# Patient Record
Sex: FEMALE | Race: BLACK OR AFRICAN AMERICAN | NOT HISPANIC OR LATINO | Employment: OTHER | URBAN - METROPOLITAN AREA
[De-identification: names, ages, dates, MRNs, and addresses within clinical notes are randomized per-mention and may not be internally consistent; named-entity substitution may affect disease eponyms.]

---

## 2017-01-23 RX ORDER — AMLODIPINE BESYLATE 2.5 MG/1
TABLET ORAL
Qty: 30 TABLET | Refills: 5 | Status: SHIPPED | OUTPATIENT
Start: 2017-01-23 | End: 2017-09-12 | Stop reason: SDUPTHER

## 2017-01-23 RX ORDER — CEPHALEXIN 250 MG/1
CAPSULE ORAL
Qty: 1 EACH | Refills: 5 | Status: SHIPPED | OUTPATIENT
Start: 2017-01-23 | End: 2018-03-21 | Stop reason: SDUPTHER

## 2017-01-23 RX ORDER — MONTELUKAST SODIUM 10 MG/1
TABLET ORAL
Qty: 30 TABLET | Refills: 5 | Status: SHIPPED | OUTPATIENT
Start: 2017-01-23 | End: 2017-09-12 | Stop reason: SDUPTHER

## 2017-01-26 ENCOUNTER — OFFICE VISIT (OUTPATIENT)
Dept: FAMILY MEDICINE | Facility: CLINIC | Age: 77
End: 2017-01-26
Payer: MEDICARE

## 2017-01-26 VITALS
DIASTOLIC BLOOD PRESSURE: 88 MMHG | HEIGHT: 65 IN | SYSTOLIC BLOOD PRESSURE: 146 MMHG | BODY MASS INDEX: 25.45 KG/M2 | HEART RATE: 72 BPM | WEIGHT: 152.75 LBS | TEMPERATURE: 98 F

## 2017-01-26 DIAGNOSIS — R53.83 FATIGUE, UNSPECIFIED TYPE: Primary | ICD-10-CM

## 2017-01-26 DIAGNOSIS — I77.9 DISORDER OF ARTERIES AND ARTERIOLES: ICD-10-CM

## 2017-01-26 DIAGNOSIS — I25.10 NON-OCCLUSIVE CORONARY ARTERY DISEASE: ICD-10-CM

## 2017-01-26 DIAGNOSIS — J42 CHRONIC BRONCHITIS, UNSPECIFIED CHRONIC BRONCHITIS TYPE: ICD-10-CM

## 2017-01-26 DIAGNOSIS — F33.0 MILD EPISODE OF RECURRENT MAJOR DEPRESSIVE DISORDER: ICD-10-CM

## 2017-01-26 PROCEDURE — 1160F RVW MEDS BY RX/DR IN RCRD: CPT | Mod: S$GLB,,, | Performed by: FAMILY MEDICINE

## 2017-01-26 PROCEDURE — 3077F SYST BP >= 140 MM HG: CPT | Mod: S$GLB,,, | Performed by: FAMILY MEDICINE

## 2017-01-26 PROCEDURE — 99999 PR PBB SHADOW E&M-EST. PATIENT-LVL III: CPT | Mod: PBBFAC,,, | Performed by: FAMILY MEDICINE

## 2017-01-26 PROCEDURE — 1125F AMNT PAIN NOTED PAIN PRSNT: CPT | Mod: S$GLB,,, | Performed by: FAMILY MEDICINE

## 2017-01-26 PROCEDURE — 3079F DIAST BP 80-89 MM HG: CPT | Mod: S$GLB,,, | Performed by: FAMILY MEDICINE

## 2017-01-26 PROCEDURE — 99499 UNLISTED E&M SERVICE: CPT | Mod: S$GLB,,, | Performed by: FAMILY MEDICINE

## 2017-01-26 PROCEDURE — 1157F ADVNC CARE PLAN IN RCRD: CPT | Mod: S$GLB,,, | Performed by: FAMILY MEDICINE

## 2017-01-26 PROCEDURE — 90670 PCV13 VACCINE IM: CPT | Mod: S$GLB,,, | Performed by: FAMILY MEDICINE

## 2017-01-26 PROCEDURE — G0009 ADMIN PNEUMOCOCCAL VACCINE: HCPCS | Mod: S$GLB,,, | Performed by: FAMILY MEDICINE

## 2017-01-26 PROCEDURE — 1159F MED LIST DOCD IN RCRD: CPT | Mod: S$GLB,,, | Performed by: FAMILY MEDICINE

## 2017-01-26 PROCEDURE — 99214 OFFICE O/P EST MOD 30 MIN: CPT | Mod: 25,S$GLB,, | Performed by: FAMILY MEDICINE

## 2017-01-26 RX ORDER — DULOXETIN HYDROCHLORIDE 60 MG/1
60 CAPSULE, DELAYED RELEASE ORAL DAILY
Qty: 30 CAPSULE | Refills: 11 | Status: SHIPPED | OUTPATIENT
Start: 2017-01-26 | End: 2018-02-20 | Stop reason: SDUPTHER

## 2017-01-26 RX ORDER — HYDROCODONE BITARTRATE AND ACETAMINOPHEN 10; 325 MG/1; MG/1
1 TABLET ORAL 2 TIMES DAILY PRN
COMMUNITY
End: 2018-01-03

## 2017-01-26 RX ORDER — ZOLPIDEM TARTRATE 5 MG/1
5 TABLET ORAL NIGHTLY PRN
Qty: 30 TABLET | Refills: 0 | Status: SHIPPED | OUTPATIENT
Start: 2017-01-26 | End: 2017-03-17

## 2017-01-27 NOTE — PROGRESS NOTES
The patient presents with a lot of somatic complaints, but mainly fatigue.  She's depressed.  No SI/HI She has stress at home.  Chronic bronchitis followed by pulmonary.  Nonobstructive coronary disease on previous catheterization.  Carotid stenosis followed by cardiology.  Complains of a lot of difficulty sleeping.  She has some confusion about which medications she is taking.  She does see pain management regarding chronic back problems.  Doesn't think trazodone helps her sleep.  Reviewed previous echocardiogram [place] September 2016 mild mitral regurgitation, tricuspid regurgitation, increased right ventricular systolic pressure with mild aortic sclerosis, but no stenosis.  Previous catheterization with mild aortic stenosis    Past Medical History:  Past Medical History   Diagnosis Date    Anxiety state 1/23/2014    CAD (coronary artery disease)     Carotid stenosis 3/25/2013     Overview:  Calcific, nonobstructive (1/31/12, 3/19/13, [facility])     Chronic back pain     Chronic bronchitis     Chronic gastritis     Chronic rhinitis     DDD (degenerative disc disease), cervical     Degeneration of lumbar or lumbosacral intervertebral disc 7/2/2014    Diverticulosis     Fractures 2015     right ring finger    History of stroke     History of stroke      ?    HTN (hypertension)     Hyperlipidemia     Inflammatory polyps of colon 3/17/15    Lumbosacral spondylosis 7/2/2014    Migraine headache     Mild aortic stenosis     Osteoporosis     Syncope     Thyroid nodule      Past Surgical History   Procedure Laterality Date    Carpal tunnel release Right     Hysterectomy      Fine needle aspiration       thyroid - colloid    Esophagogastroduodenoscopy  2/15/2012    Colonoscopy w/ biopsies  8/26/2010     benign mucosa    Lower extremitry angiogram  06/06/2014     no significant disease    Esophagogastroduodenoscopy  3/2015    Fine needle aspiration  1/15/2014     Thyroid- benign    Cardiac  catheterization  12/11/2014     LAD mid-vessel eccentric stenosis of around 50%     Social History     Social History    Marital status:      Spouse name: N/A    Number of children: N/A    Years of education: N/A     Occupational History    Not on file.     Social History Main Topics    Smoking status: Never Smoker    Smokeless tobacco: Not on file    Alcohol use No    Drug use: Not on file    Sexual activity: Not on file     Other Topics Concern    Not on file     Social History Narrative     Family History   Problem Relation Age of Onset    Hypertension Mother     Throat cancer Mother     Heart attack Mother 60     MI    Hypertension Sister     Hypertension Father     Thyroid disease Sister     Stroke Sister      Review of patient's allergies indicates:   Allergen Reactions    Codeine     Sertraline      Unknown^    Sulfa (sulfonamide antibiotics)      Current Outpatient Prescriptions on File Prior to Visit   Medication Sig Dispense Refill    ADVAIR DISKUS 100-50 mcg/dose diskus inhaler INHALE 1 PUFF INTO THE LUNGS TWICE A DAY 1 each 5    albuterol (PROAIR HFA) 90 mcg/actuation inhaler Inhale 2 puffs into the lungs every 6 (six) hours as needed for Wheezing. 3 Inhaler 3    amlodipine (NORVASC) 2.5 MG tablet TAKE 1 TABLET EVERY DAY 30 tablet 5    atorvastatin (LIPITOR) 40 MG tablet Take 40 mg by mouth once daily.       azelastine (ASTELIN) 137 mcg nasal spray 1 spray by Nasal route 2 (two) times daily.      BONIVA 150 mg tablet TAKE 1 TABLET (150 MG TOTAL) BY MOUTH EVERY 30 DAYS. 4 tablet 3    clopidogrel (PLAVIX) 75 mg tablet TAKE 1 TABLET EVERY DAY 90 tablet 0    montelukast (SINGULAIR) 10 mg tablet TAKE 1 TABLET EVERY DAY 30 tablet 5    nitroGLYCERIN (NITROSTAT) 0.4 MG SL tablet Place 0.4 mg under the tongue every 5 (five) minutes as needed for Chest pain.      ranitidine (ZANTAC) 300 MG tablet Take 300 mg by mouth nightly.       [DISCONTINUED] benzonatate (TESSALON) 100 MG  "capsule Take 1 capsule (100 mg total) by mouth 3 (three) times daily as needed. 30 capsule 0    [DISCONTINUED] duloxetine (CYMBALTA) 60 MG capsule Take 1 capsule (60 mg total) by mouth once daily. 30 capsule 11    [DISCONTINUED] isosorbide mononitrate (IMDUR) 30 MG 24 hr tablet Take 1 tablet (30 mg total) by mouth once daily. 90 tablet 3    [DISCONTINUED] LYRICA 50 mg capsule Take 50 mg by mouth 3 (three) times daily.       [DISCONTINUED] ranolazine (RANEXA) 500 MG Tb12 Take 500 mg by mouth 2 (two) times daily.      [DISCONTINUED] tramadol (ULTRAM) 50 mg tablet TAKE 1 TABLET 3 TIMES A DAY AS NEEDED FOR PAIN 90 tablet 0    [DISCONTINUED] trazodone (DESYREL) 50 MG tablet TAKE 1 TABLET IN THE EVENING 90 tablet 1    [DISCONTINUED] azithromycin (Z-MIRACLE) 250 MG tablet Take 2 tablets on day 1, then 1 tablet daily on days 2 - 5. 6 tablet 0    [DISCONTINUED] duloxetine (CYMBALTA) 60 MG capsule TAKE 1 CAPSULE EVERY DAY 30 capsule 5    [DISCONTINUED] methylPREDNISolone (MEDROL DOSEPACK) 4 mg tablet follow package directions 21 tablet 0     No current facility-administered medications on file prior to visit.            ROS:  GENERAL: No fever, chills,  or significant weight changes.   CARDIOVASCULAR: Denies chest pain, PND, orthopnea or reduced exercise tolerance.  ABDOMEN: Appetite fine. Denies diarrhea, abdominal pain, hematemesis or blood in stool.  URINARY: No flank pain, dysuria or hematuria.      OBJECTIVE:     Vitals:    01/26/17 1044   BP: (!) 146/88   Pulse: 72   Temp: 97.5 °F (36.4 °C)   Weight: 69.3 kg (152 lb 12.5 oz)   Height: 5' 5" (1.651 m)     Wt Readings from Last 3 Encounters:   01/26/17 69.3 kg (152 lb 12.5 oz)   06/16/16 72.8 kg (160 lb 7.9 oz)   04/05/16 72.5 kg (159 lb 12.8 oz)     APPEARANCE: Well nourished, well developed, in no acute distress.    HEAD: Normocephalic.  Atraumatic.  No sinus tenderness.  EYES:   Right eye: Pupil reactive.  Conjunctiva clear.    Left eye: Pupil reactive.  " Conjunctiva clear.    Both fundi:  Grossly normal to nondilated exam. EOMI.    EARS: TM's intact. Light reflex normal. No retraction or perforation.    NOSE:  clear.  MOUTH & THROAT:  No pharyngeal erythema or exudate. No lesions.  NECK: Supple. No bruits.  No JVD.  No cervical lymphadenopathy.  No thyromegaly.    CHEST: Breath sounds clear bilaterally.  Normal respiratory effort  CARDIOVASCULAR: Normal rate.  Regular rhythm.  No murmurs.  No rub.  No gallops.  ABDOMEN: Bowel sounds normal.  Soft.  No tenderness.  No organomegaly.  PERIPHERAL VASCULAR: No cyanosis.  No clubbing.  No edema.  NEUROLOGIC: No focal findings.  MENTAL STATUS: Alert.  Oriented x 3.        Marychuy was seen today for headache.    Diagnoses and all orders for this visit:    Fatigue, unspecified type    Mild episode of recurrent major depressive disorder    Chronic bronchitis, unspecified chronic bronchitis type    Non-occlusive coronary artery disease    Carotid stenosis    Other orders  -     zolpidem (AMBIEN) 5 MG Tab; Take 1 tablet (5 mg total) by mouth nightly as needed.  -     duloxetine (CYMBALTA) 60 MG capsule; Take 1 capsule (60 mg total) by mouth once daily.  -     Pneumococcal Conjugate Vaccine (13 Valent) (IM)      Medications Discontinued During This Encounter   Medication Reason    azithromycin (Z-MIRACLE) 250 MG tablet     duloxetine (CYMBALTA) 60 MG capsule     methylPREDNISolone (MEDROL DOSEPACK) 4 mg tablet     tramadol (ULTRAM) 50 mg tablet Patient no longer taking    LYRICA 50 mg capsule Patient no longer taking    benzonatate (TESSALON) 100 MG capsule Patient no longer taking    isosorbide mononitrate (IMDUR) 30 MG 24 hr tablet Patient no longer taking    ranolazine (RANEXA) 500 MG Tb12 Patient no longer taking    trazodone (DESYREL) 50 MG tablet     duloxetine (CYMBALTA) 60 MG capsule Reorder     Follow-up appointment one month

## 2017-03-17 ENCOUNTER — LAB VISIT (OUTPATIENT)
Dept: LAB | Facility: HOSPITAL | Age: 77
End: 2017-03-17
Attending: FAMILY MEDICINE
Payer: MEDICARE

## 2017-03-17 ENCOUNTER — OFFICE VISIT (OUTPATIENT)
Dept: FAMILY MEDICINE | Facility: CLINIC | Age: 77
End: 2017-03-17
Payer: MEDICARE

## 2017-03-17 VITALS
WEIGHT: 151.56 LBS | SYSTOLIC BLOOD PRESSURE: 127 MMHG | DIASTOLIC BLOOD PRESSURE: 75 MMHG | HEART RATE: 85 BPM | BODY MASS INDEX: 25.25 KG/M2 | HEIGHT: 65 IN

## 2017-03-17 DIAGNOSIS — R07.9 CHEST PAIN, UNSPECIFIED TYPE: Primary | ICD-10-CM

## 2017-03-17 DIAGNOSIS — R53.83 FATIGUE, UNSPECIFIED TYPE: ICD-10-CM

## 2017-03-17 DIAGNOSIS — I25.10 NON-OCCLUSIVE CORONARY ARTERY DISEASE: ICD-10-CM

## 2017-03-17 DIAGNOSIS — R30.0 DYSURIA: ICD-10-CM

## 2017-03-17 DIAGNOSIS — F33.0 MILD EPISODE OF RECURRENT MAJOR DEPRESSIVE DISORDER: ICD-10-CM

## 2017-03-17 LAB
ALBUMIN SERPL BCP-MCNC: 3.3 G/DL
ALP SERPL-CCNC: 64 U/L
ALT SERPL W/O P-5'-P-CCNC: 20 U/L
ANION GAP SERPL CALC-SCNC: 7 MMOL/L
AST SERPL-CCNC: 20 U/L
BASOPHILS # BLD AUTO: 0.01 K/UL
BASOPHILS NFR BLD: 0.2 %
BILIRUB SERPL-MCNC: 0.3 MG/DL
BILIRUB UR QL STRIP: NEGATIVE
BUN SERPL-MCNC: 17 MG/DL
CALCIUM SERPL-MCNC: 9.3 MG/DL
CHLORIDE SERPL-SCNC: 107 MMOL/L
CLARITY UR: CLEAR
CO2 SERPL-SCNC: 30 MMOL/L
COLOR UR: YELLOW
CREAT SERPL-MCNC: 1 MG/DL
DIFFERENTIAL METHOD: NORMAL
EOSINOPHIL # BLD AUTO: 0 K/UL
EOSINOPHIL NFR BLD: 0.8 %
ERYTHROCYTE [DISTWIDTH] IN BLOOD BY AUTOMATED COUNT: 13.6 %
ERYTHROCYTE [SEDIMENTATION RATE] IN BLOOD BY WESTERGREN METHOD: 17 MM/HR
EST. GFR  (AFRICAN AMERICAN): >60 ML/MIN/1.73 M^2
EST. GFR  (NON AFRICAN AMERICAN): 54.1 ML/MIN/1.73 M^2
GLUCOSE SERPL-MCNC: 108 MG/DL
GLUCOSE UR QL STRIP: NEGATIVE
HCT VFR BLD AUTO: 41.5 %
HGB BLD-MCNC: 13.4 G/DL
HGB UR QL STRIP: NEGATIVE
KETONES UR QL STRIP: NEGATIVE
LEUKOCYTE ESTERASE UR QL STRIP: NEGATIVE
LYMPHOCYTES # BLD AUTO: 1.4 K/UL
LYMPHOCYTES NFR BLD: 29.1 %
MCH RBC QN AUTO: 28.5 PG
MCHC RBC AUTO-ENTMCNC: 32.3 %
MCV RBC AUTO: 88 FL
MONOCYTES # BLD AUTO: 0.4 K/UL
MONOCYTES NFR BLD: 9.1 %
NEUTROPHILS # BLD AUTO: 2.9 K/UL
NEUTROPHILS NFR BLD: 60.4 %
NITRITE UR QL STRIP: NEGATIVE
PH UR STRIP: 6 [PH] (ref 5–8)
PLATELET # BLD AUTO: 197 K/UL
PMV BLD AUTO: 11.5 FL
POTASSIUM SERPL-SCNC: 4.1 MMOL/L
PROT SERPL-MCNC: 7.2 G/DL
PROT UR QL STRIP: ABNORMAL
RBC # BLD AUTO: 4.71 M/UL
SODIUM SERPL-SCNC: 144 MMOL/L
SP GR UR STRIP: 1.03 (ref 1–1.03)
TSH SERPL DL<=0.005 MIU/L-ACNC: 0.73 UIU/ML
URN SPEC COLLECT METH UR: ABNORMAL
WBC # BLD AUTO: 4.74 K/UL

## 2017-03-17 PROCEDURE — 84443 ASSAY THYROID STIM HORMONE: CPT

## 2017-03-17 PROCEDURE — 99499 UNLISTED E&M SERVICE: CPT | Mod: S$GLB,,, | Performed by: FAMILY MEDICINE

## 2017-03-17 PROCEDURE — 36415 COLL VENOUS BLD VENIPUNCTURE: CPT | Mod: PO

## 2017-03-17 PROCEDURE — 85025 COMPLETE CBC W/AUTO DIFF WBC: CPT

## 2017-03-17 PROCEDURE — 99999 PR PBB SHADOW E&M-EST. PATIENT-LVL III: CPT | Mod: PBBFAC,,, | Performed by: FAMILY MEDICINE

## 2017-03-17 PROCEDURE — 1157F ADVNC CARE PLAN IN RCRD: CPT | Mod: S$GLB,,, | Performed by: FAMILY MEDICINE

## 2017-03-17 PROCEDURE — 1125F AMNT PAIN NOTED PAIN PRSNT: CPT | Mod: S$GLB,,, | Performed by: FAMILY MEDICINE

## 2017-03-17 PROCEDURE — 85651 RBC SED RATE NONAUTOMATED: CPT | Mod: PO

## 2017-03-17 PROCEDURE — 1159F MED LIST DOCD IN RCRD: CPT | Mod: S$GLB,,, | Performed by: FAMILY MEDICINE

## 2017-03-17 PROCEDURE — 3074F SYST BP LT 130 MM HG: CPT | Mod: S$GLB,,, | Performed by: FAMILY MEDICINE

## 2017-03-17 PROCEDURE — 3078F DIAST BP <80 MM HG: CPT | Mod: S$GLB,,, | Performed by: FAMILY MEDICINE

## 2017-03-17 PROCEDURE — 80053 COMPREHEN METABOLIC PANEL: CPT

## 2017-03-17 PROCEDURE — 99214 OFFICE O/P EST MOD 30 MIN: CPT | Mod: S$GLB,,, | Performed by: FAMILY MEDICINE

## 2017-03-17 RX ORDER — NAPROXEN SODIUM 220 MG/1
81 TABLET, FILM COATED ORAL DAILY
COMMUNITY
End: 2017-05-22 | Stop reason: SDUPTHER

## 2017-03-17 RX ORDER — PANTOPRAZOLE SODIUM 40 MG/1
40 TABLET, DELAYED RELEASE ORAL DAILY
COMMUNITY
Start: 2017-03-04 | End: 2017-03-17

## 2017-03-17 RX ORDER — PANTOPRAZOLE SODIUM 40 MG/1
40 TABLET, DELAYED RELEASE ORAL 2 TIMES DAILY
COMMUNITY
End: 2018-01-03 | Stop reason: SDUPTHER

## 2017-03-17 RX ORDER — ATORVASTATIN CALCIUM 80 MG/1
40 TABLET, FILM COATED ORAL DAILY
COMMUNITY
End: 2021-01-20 | Stop reason: ALTCHOICE

## 2017-03-17 NOTE — MR AVS SNAPSHOT
[facility]  [address] [address] [facility] [address]  Phone: [phone]  Fax: [phone]                  Marychuy Bruce   3/17/2017 10:00 AM   Office Visit    Description:  Female : 1940   Provider:  Yohannes Toribio MD   Department:  [facility]           Reason for Visit     Follow-up           Diagnoses this Visit        Comments    Chest pain, unspecified type    -  Primary     Mild episode of recurrent major depressive disorder         Dysuria         Non-occlusive coronary artery disease         Fatigue, unspecified type                To Do List           Future Appointments        Provider Department Dept Phone    3/17/2017 2:25 PM LABORATORY, [place] [facility] [phone]      Goals (5 Years of Data)     None      [facility] On [facility]     [facility] -  Assistance  Registered nurses in the [facility] provide clinical advisement, health education, appointment booking, and other advisory services.  Call for this free service at [phone].             Medications           Message regarding Medications     Verify the changes and/or additions to your medication regime listed below are the same as discussed with your clinician today.  If any of these changes or additions are incorrect, please notify your healthcare provider.        STOP taking these medications     ranitidine (ZANTAC) 300 MG tablet Take 300 mg by mouth nightly.     zolpidem (AMBIEN) 5 MG Tab Take 1 tablet (5 mg total) by mouth nightly as needed.           Verify that the below list of medications is an accurate representation of the medications you are currently taking.  If none reported, the list may be blank. If incorrect, please contact your healthcare provider. Carry this list with you in case of emergency.           Current Medications     ADVAIR DISKUS 100-50 mcg/dose diskus inhaler INHALE 1 PUFF INTO THE LUNGS TWICE A DAY     "albuterol (PROAIR HFA) 90 mcg/actuation inhaler Inhale 2 puffs into the lungs every 6 (six) hours as needed for Wheezing.    amlodipine (NORVASC) 2.5 MG tablet TAKE 1 TABLET EVERY DAY    aspirin 81 MG Chew Take 81 mg by mouth once daily.    atorvastatin (LIPITOR) 80 MG tablet Take 80 mg by mouth once daily.    BONIVA 150 mg tablet TAKE 1 TABLET (150 MG TOTAL) BY MOUTH EVERY 30 DAYS.    duloxetine (CYMBALTA) 60 MG capsule Take 1 capsule (60 mg total) by mouth once daily.    hydrocodone-acetaminophen 10-325mg (NORCO)  mg Tab Take 1 tablet by mouth 2 (two) times daily as needed.    montelukast (SINGULAIR) 10 mg tablet TAKE 1 TABLET EVERY DAY    nitroGLYCERIN (NITROSTAT) 0.4 MG SL tablet Place 0.4 mg under the tongue every 5 (five) minutes as needed for Chest pain.    pantoprazole (PROTONIX) 40 MG tablet Take 40 mg by mouth once daily.    azelastine (ASTELIN) 137 mcg nasal spray 1 spray by Nasal route 2 (two) times daily.    clopidogrel (PLAVIX) 75 mg tablet TAKE 1 TABLET EVERY DAY           Clinical Reference Information           Your Vitals Were     BP Pulse Height Weight BMI    127/75 85 5' 5" (1.651 m) 68.8 kg (151 lb 9.1 oz) 25.22 kg/m2      Blood Pressure          Most Recent Value    BP  127/75      Allergies as of 3/17/2017     Codeine    Sertraline    Sulfa (Sulfonamide Antibiotics)      Immunizations Administered on Date of Encounter - 3/17/2017     None      Orders Placed During Today's Visit      Normal Orders This Visit    Ambulatory referral to Cardiology     Ambulatory referral to Psychology     Urinalysis     Future Labs/Procedures Expected by Expires    CBC auto differential  3/17/2017 3/17/2018    Comprehensive metabolic panel  3/17/2017 3/17/2018    Sedimentation rate, manual  3/17/2017 3/17/2018    TSH  3/17/2017 3/18/2018      Language Assistance Services     ATTENTION: Language assistance services are available, free of charge. Please call [phone].      ATENCIÓN: Alec hahn, " tiene a gallagher disposición servicios gratuitos de asistencia lingüística. Llsabina al [phone].     ANN Ý: N?u b?n nói Ti?ng Vi?t, có các d?ch v? h? tr? ngôn ng? mi?n phí dành cho b?n. G?i s? [phone].         [facility] complies with applicable Federal civil rights laws and does not discriminate on the basis of race, color, national origin, age, disability, or sex.

## 2017-03-17 NOTE — PROGRESS NOTES
The patient presents follow-up lot of somatic complaints, but mainly fatigue.  She's still has some depression  No SI/HI She has stress at home.   had a stroke and his memory issues.  She did get some minimal dysuria couple days ago which has resolved.  She did have some chest discomfort to her left arm lasting about 15 minutes a couple of nights ago which improved with nitroglycerin.  She's due for follow-up with her cardiologist.  Chronic bronchitis followed by pulmonary.  Nonobstructive coronary disease on previous catheterization.  Carotid stenosis followed by cardiology.  Feels she is sleeping better. She does see pain management regarding chronic back problems.    Reviewed previous echocardiogram [place] September 2016 mild mitral regurgitation, tricuspid regurgitation, increased right ventricular systolic pressure with mild aortic sclerosis, but no stenosis.  Previous catheterization with mild aortic stenosis.  Some cough in the past week    Past Medical History:  Past Medical History:   Diagnosis Date    Anxiety state 1/23/2014    CAD (coronary artery disease)     Carotid stenosis 3/25/2013    Overview:  Calcific, nonobstructive (1/31/12, 3/19/13, [facility])     Chronic back pain     Chronic bronchitis     Chronic gastritis     Chronic rhinitis     DDD (degenerative disc disease), cervical     Degeneration of lumbar or lumbosacral intervertebral disc 7/2/2014    Diverticulosis     Fractures 2015    right ring finger    History of stroke     History of stroke     ?    HTN (hypertension)     Hyperlipidemia     Inflammatory polyps of colon 3/17/15    Lumbosacral spondylosis 7/2/2014    Migraine headache     Mild aortic stenosis     Osteoporosis     Syncope     Thyroid nodule      Past Surgical History:   Procedure Laterality Date    CARDIAC CATHETERIZATION  12/11/2014    LAD mid-vessel eccentric stenosis of around 50%    CARPAL TUNNEL RELEASE Right     COLONOSCOPY W/ BIOPSIES   8/26/2010    benign mucosa    ESOPHAGOGASTRODUODENOSCOPY  2/15/2012    ESOPHAGOGASTRODUODENOSCOPY  3/2015    FINE NEEDLE ASPIRATION      thyroid - colloid    FINE NEEDLE ASPIRATION  1/15/2014    Thyroid- benign    HYSTERECTOMY      Lower extremitry angiogram  06/06/2014    no significant disease     Social History     Social History    Marital status:      Spouse name: N/A    Number of children: N/A    Years of education: N/A     Occupational History    Not on file.     Social History Main Topics    Smoking status: Never Smoker    Smokeless tobacco: Not on file    Alcohol use No    Drug use: Not on file    Sexual activity: Not on file     Other Topics Concern    Not on file     Social History Narrative     Family History   Problem Relation Age of Onset    Hypertension Mother     Throat cancer Mother     Heart attack Mother 60     MI    Hypertension Sister     Hypertension Father     Thyroid disease Sister     Stroke Sister      Review of patient's allergies indicates:   Allergen Reactions    Codeine     Sertraline      Unknown^    Sulfa (sulfonamide antibiotics)      Current Outpatient Prescriptions on File Prior to Visit   Medication Sig Dispense Refill    ADVAIR DISKUS 100-50 mcg/dose diskus inhaler INHALE 1 PUFF INTO THE LUNGS TWICE A DAY 1 each 5    albuterol (PROAIR HFA) 90 mcg/actuation inhaler Inhale 2 puffs into the lungs every 6 (six) hours as needed for Wheezing. 3 Inhaler 3    amlodipine (NORVASC) 2.5 MG tablet TAKE 1 TABLET EVERY DAY 30 tablet 5    BONIVA 150 mg tablet TAKE 1 TABLET (150 MG TOTAL) BY MOUTH EVERY 30 DAYS. 4 tablet 3    duloxetine (CYMBALTA) 60 MG capsule Take 1 capsule (60 mg total) by mouth once daily. 30 capsule 11    hydrocodone-acetaminophen 10-325mg (NORCO)  mg Tab Take 1 tablet by mouth 2 (two) times daily as needed.      montelukast (SINGULAIR) 10 mg tablet TAKE 1 TABLET EVERY DAY 30 tablet 5    nitroGLYCERIN (NITROSTAT) 0.4 MG SL  "tablet Place 0.4 mg under the tongue every 5 (five) minutes as needed for Chest pain.      azelastine (ASTELIN) 137 mcg nasal spray 1 spray by Nasal route 2 (two) times daily.      clopidogrel (PLAVIX) 75 mg tablet TAKE 1 TABLET EVERY DAY 90 tablet 0    [DISCONTINUED] atorvastatin (LIPITOR) 40 MG tablet Take 40 mg by mouth once daily.       [DISCONTINUED] ranitidine (ZANTAC) 300 MG tablet Take 300 mg by mouth nightly.       [DISCONTINUED] zolpidem (AMBIEN) 5 MG Tab Take 1 tablet (5 mg total) by mouth nightly as needed. 30 tablet 0     No current facility-administered medications on file prior to visit.            ROS:  GENERAL: No fever, chills,  or significant weight changes.   CARDIOVASCULAR: Denies chest pain, PND, orthopnea or reduced exercise tolerance.  ABDOMEN: Appetite fine. Denies diarrhea, abdominal pain, hematemesis or blood in stool.  URINARY: No flank pain, dysuria or hematuria.      OBJECTIVE:     Vitals:    03/17/17 0954   BP: 127/75   Pulse: 85   Weight: 68.8 kg (151 lb 9.1 oz)   Height: 5' 5" (1.651 m)     Wt Readings from Last 3 Encounters:   03/17/17 68.8 kg (151 lb 9.1 oz)   01/26/17 69.3 kg (152 lb 12.5 oz)   06/16/16 72.8 kg (160 lb 7.9 oz)     APPEARANCE: Well nourished, well developed, in no acute distress.    HEAD: Normocephalic.  Atraumatic.  No sinus tenderness.  EYES:   Right eye: Pupil reactive.  Conjunctiva clear.    Left eye: Pupil reactive.  Conjunctiva clear.    Both fundi:  Grossly normal to nondilated exam. EOMI.    EARS: TM's intact. Light reflex normal. No retraction or perforation.    NOSE:  clear.  MOUTH & THROAT:  No pharyngeal erythema or exudate. No lesions.  NECK: Supple. No bruits.  No JVD.  No cervical lymphadenopathy.  No thyromegaly.    CHEST: Breath sounds clear bilaterally.  Normal respiratory effort  CARDIOVASCULAR: Normal rate.  Regular rhythm.  No murmurs.  No rub.  No gallops.  ABDOMEN: Bowel sounds normal.  Soft.  No tenderness.  No organomegaly.  PERIPHERAL " VASCULAR: No cyanosis.  No clubbing.  No edema.  NEUROLOGIC: No focal findings.  MENTAL STATUS: Alert.  Oriented x 3.        Marychuy was seen today for follow-up.    Diagnoses and all orders for this visit:    Chest pain, unspecified type  -     Ambulatory referral to Cardiology    Mild episode of recurrent major depressive disorder  -     Ambulatory referral to Psychology    Dysuria  -     Urinalysis    Non-occlusive coronary artery disease  -     Ambulatory referral to Cardiology    Fatigue, unspecified type  -     CBC auto differential; Future  -     Comprehensive metabolic panel; Future  -     Sedimentation rate, manual; Future  -     TSH; Future      Medications Discontinued During This Encounter   Medication Reason    pantoprazole (PROTONIX) 40 MG tablet     atorvastatin (LIPITOR) 40 MG tablet Discontinued by another clinician    ranitidine (ZANTAC) 300 MG tablet Discontinued by another clinician    zolpidem (AMBIEN) 5 MG Tab Patient no longer taking     Medication list updated.  She'll continue medications as per current list today.  She didn't bring her medicines in today.  She'll schedule follow-up with her cardiologist.  ER precautions given.  Follow-up pending above laboratory

## 2017-03-20 ENCOUNTER — TELEPHONE (OUTPATIENT)
Dept: FAMILY MEDICINE | Facility: CLINIC | Age: 77
End: 2017-03-20

## 2017-03-20 NOTE — TELEPHONE ENCOUNTER
Patient does not have voice mail, letter mailed asking her to contact her insurance company for a doctor she can see.

## 2017-03-20 NOTE — TELEPHONE ENCOUNTER
----- Message from Bianca Berrios sent at 3/20/2017 10:58 AM CDT -----  Call pt at  [phone]//returning your call//chano jamison

## 2017-03-20 NOTE — TELEPHONE ENCOUNTER
----- Message from Mary Sadler sent at 3/20/2017 10:32 AM CDT -----  Contact: dr zavala  States he received referral for pt but is not in network for pt insurance. Please call back at [phone]. Thanks//cdb

## 2017-03-21 RX ORDER — CLOPIDOGREL BISULFATE 75 MG/1
TABLET ORAL
Qty: 90 TABLET | Refills: 3 | Status: SHIPPED | OUTPATIENT
Start: 2017-03-21 | End: 2017-05-22 | Stop reason: SDUPTHER

## 2017-03-21 RX ORDER — CLOPIDOGREL BISULFATE 75 MG/1
TABLET ORAL
Qty: 90 TABLET | Refills: 0 | Status: SHIPPED | OUTPATIENT
Start: 2017-03-21 | End: 2018-04-23 | Stop reason: SDUPTHER

## 2017-05-16 ENCOUNTER — PATIENT OUTREACH (OUTPATIENT)
Dept: ADMINISTRATIVE | Facility: CLINIC | Age: 77
End: 2017-05-16
Payer: MEDICARE

## 2017-05-16 ENCOUNTER — TELEPHONE (OUTPATIENT)
Dept: FAMILY MEDICINE | Facility: CLINIC | Age: 77
End: 2017-05-16

## 2017-05-16 NOTE — TELEPHONE ENCOUNTER
----- Message from Christiana Nelson sent at 5/16/2017 11:14 AM CDT -----  Contact: Self  Pt is returning a call to Ms. Denisse.    She can be reached at [phone].    Thank you.

## 2017-05-16 NOTE — PROGRESS NOTES
Discharge Information     Discharge Date:  5/14/17          Primary Discharge Diagnosis:  Acute chest pain          Denisse Pittman RN attempted to contact patient. No answer. The following message was left for the patient to return the call:  Good morning, I am a nurse calling on behalf of [facility] from the [facility].  This is a Transitional Care Call for Marychuy Bruce. When you have a moment please contact us at [phone] or [phone] Monday through Friday, between the hours of 8 am to 4 pm. We look forward to speaking with you. On behalf of [facility] have a nice day.    The patient has a scheduled HOSFU appointment with Yohannes Toribio MD on 5/22/17 @ 0900hrs. Message sent to Physician staff.

## 2017-05-22 ENCOUNTER — OFFICE VISIT (OUTPATIENT)
Dept: FAMILY MEDICINE | Facility: CLINIC | Age: 77
End: 2017-05-22
Payer: MEDICARE

## 2017-05-22 VITALS
TEMPERATURE: 98 F | HEART RATE: 69 BPM | BODY MASS INDEX: 24.32 KG/M2 | DIASTOLIC BLOOD PRESSURE: 76 MMHG | WEIGHT: 145.94 LBS | HEIGHT: 65 IN | SYSTOLIC BLOOD PRESSURE: 130 MMHG

## 2017-05-22 DIAGNOSIS — R53.83 FATIGUE, UNSPECIFIED TYPE: ICD-10-CM

## 2017-05-22 DIAGNOSIS — R07.89 OTHER CHEST PAIN: Primary | ICD-10-CM

## 2017-05-22 DIAGNOSIS — Z79.899 POLYPHARMACY: ICD-10-CM

## 2017-05-22 PROCEDURE — 99999 PR PBB SHADOW E&M-EST. PATIENT-LVL IV: CPT | Mod: PBBFAC,,, | Performed by: FAMILY MEDICINE

## 2017-05-22 PROCEDURE — 99499 UNLISTED E&M SERVICE: CPT | Mod: S$GLB,,, | Performed by: FAMILY MEDICINE

## 2017-05-22 RX ORDER — PREGABALIN 50 MG/1
50 CAPSULE ORAL DAILY
COMMUNITY
Start: 2015-02-03 | End: 2018-01-03

## 2017-05-22 RX ORDER — FLUTICASONE PROPIONATE 50 MCG
2 SPRAY, SUSPENSION (ML) NASAL DAILY
COMMUNITY
Start: 2016-12-22 | End: 2018-01-03

## 2017-05-22 RX ORDER — ASPIRIN 81 MG/1
81 TABLET ORAL
COMMUNITY

## 2017-05-22 NOTE — PROGRESS NOTES
Patient presents follow-up as per below discharge summary after hospitalization for chest pain.  Negative nuclear stress test.  Denies persistent symptoms.  She does feel generally weak which is chronic and has a lot of somatic symptoms as previously.    Theresa Pal NP - 05/14/2017 2:42 PM CDT  DATES OF SERVICE: 05/13/2017 -     ADMITTING DIAGNOSES:  1. Chest pain.  2. Hypertension.  3. Hyperlipidemia.  4. Left thyroid nodule.  5. Gastroesophageal reflux disease.    DISCHARGE DIAGNOSES:  1. Chest pain.  2. Hypertension.  3. Hyperlipidemia.  4. Left thyroid nodule.  5. Gastroesophageal reflux disease.    CONSULTS: None.    PROCEDURES: None.    HOSPITAL COURSE: The patient is a 77-year-old black female who has an   extensive past medical history, who presented to the Emergency Department via   EMS with complaints of chest pain that woke her up from sleep. She described   the pain as sharp and severe, located in the middle of her chest, radiating to  her bilateral shoulders and arms, accompanied by nausea, shortness of breath,  near syncope, and diaphoresis. She took 2 sublingual nitroglycerin tablets   with minimal improvement in her symptoms. She denies experiencing any pain   similar to this in the past. When EMS arrived, she was given 4 baby aspirin,   which improved her pain. Past medical history relative to her presenting   complaint is nonobstructive coronary artery disease by left heart cath in   2013. She is followed by Dr. Hinds in the outpatient setting. She had a   recent ultrasound done a couple of days ago, which revealed a 2.4 x 1.8 x 2.2   cm partially cystic, partially solid nodule within the left thyroid lobe. In   the Emergency Department, her initial workup was unremarkable, including her   initial cardiac markers, which were negative, and an EKG, which showed no   acute ST or T wave abnormalities. She was admitted to the observation unit,   RDTU protocol. Acute coronary syndrome was ruled out with  negative cardiac   markers x3 sets. She underwent a nuclear medicine perfusion SPECT stress and   rest tests with the impression of a normal nuclear stress test. She has had   no further complaints of chest pain. Other diagnostic images done this   hospitalization include a 2-view chest x-ray, which showed no acute findings.   Her labs are stable on date of discharge. She will be discharged home today.  She is already on a PPI; therefore, we will have her follow up with her PCP,  Dr. Troibio, to explore noncardiac sources of chest pain. She will continue   with her cardiac medications as before.    For her hypertension, her blood pressures are stable and controlled. She will  continue with her current regimen. For her hyperlipidemia, she will continue  with statin and a cardiac diet. For her left thyroid nodule, she will follow  up with Dr. Toribio for further outpatient management of this. For her GERD,   she will continue with PPI.    She is hemodynamically stable and will be discharged home today. I have   explained the discharge instructions to her. All questions have been   answered.    DISPOSITION: Home.    FOLLOWUP: Follow up with Dr. Toribio in 1 week, Dr. Hinds in 2 weeks.    MEDICATIONS: Include the followin. Tudorza 1 puff b.i.d.  2. Advair 100/50 mcg 1 puff b.i.d.  3. Norvasc 5 mg daily.  4. Enteric-coated 81 mg aspirin daily.  5. Atorvastatin 80 mg daily.  6. Cymbalta 30 mg daily.  7. Flonase 2 sprays each naris nightly.  8. Norco 7.5 mg 1 p.o. every 6 hours as needed for pain  9. Boniva 150 mg q.30 days.  10. Lyrica 50 mg daily.  11. Mobic 7.5 mg daily.  12. Singulair 10 mg nightly.  13. Nitrostat 0.4 mg sublingual q.5 minutes x3 p.r.n. chest pain.  14. Protonix 40 mg daily.  15. Plavix 75 mg daily.  16. ProAir 2 puffs q.4-6 hours p.r.n. wheezing and shortness of breath.  17. Ranexa 500 mg b.i.d.  18. Carafate 2 grams 4 times a day.  19. Tramadol 50 mg t.i.d.  20. Trazodone 50 mg daily.    ACTIVITY:  As tolerated.    DIET: Cardiac, low cholesterol, no added salt.    Theresa Pal NP  For a full list of reconciled medications on discharge, please refer to the   Discharge Orders and After Visit Summary.   V# [id number] D# [id number]  D: aw/WT: DS/T: sun  DD: 05/14/2017 14:02 TD: 05/14/2017 14:42           Past Medical History:  Past Medical History:   Diagnosis Date    Anxiety state 1/23/2014    CAD (coronary artery disease)     Carotid stenosis 3/25/2013    Overview:  Calcific, nonobstructive (1/31/12, 3/19/13, [facility])     Chronic back pain     Chronic bronchitis     Chronic gastritis     Chronic rhinitis     DDD (degenerative disc disease), cervical     Degeneration of lumbar or lumbosacral intervertebral disc 7/2/2014    Diverticulosis     Fractures 2015    right ring finger    History of stroke     History of stroke     ?    HTN (hypertension)     Hyperlipidemia     Inflammatory polyps of colon 3/17/15    Lumbosacral spondylosis 7/2/2014    Migraine headache     Mild aortic stenosis     Osteoporosis     Syncope     Thyroid nodule      Past Surgical History:   Procedure Laterality Date    CARDIAC CATHETERIZATION  12/11/2014    LAD mid-vessel eccentric stenosis of around 50%    CARPAL TUNNEL RELEASE Right     COLONOSCOPY W/ BIOPSIES  8/26/2010    benign mucosa    ESOPHAGOGASTRODUODENOSCOPY  2/15/2012    ESOPHAGOGASTRODUODENOSCOPY  3/2015    FINE NEEDLE ASPIRATION      thyroid - colloid    FINE NEEDLE ASPIRATION  1/15/2014    Thyroid- benign    HYSTERECTOMY      Lower extremitry angiogram  06/06/2014    no significant disease     Social History     Social History    Marital status:      Spouse name: N/A    Number of children: N/A    Years of education: N/A     Occupational History    Not on file.     Social History Main Topics    Smoking status: Never Smoker    Smokeless tobacco: Not on file    Alcohol use No    Drug use: Unknown    Sexual activity: Not on file     Other  Topics Concern    Not on file     Social History Narrative    No narrative on file     Family History   Problem Relation Age of Onset    Hypertension Mother     Throat cancer Mother     Heart attack Mother 60     MI    Hypertension Sister     Hypertension Father     Thyroid disease Sister     Stroke Sister      Review of patient's allergies indicates:   Allergen Reactions    Codeine     Sertraline      Unknown^    Sulfa (sulfonamide antibiotics)      Current Outpatient Prescriptions on File Prior to Visit   Medication Sig Dispense Refill    ADVAIR DISKUS 100-50 mcg/dose diskus inhaler INHALE 1 PUFF INTO THE LUNGS TWICE A DAY 1 each 5    albuterol (PROAIR HFA) 90 mcg/actuation inhaler Inhale 2 puffs into the lungs every 6 (six) hours as needed for Wheezing. 3 Inhaler 3    amlodipine (NORVASC) 2.5 MG tablet TAKE 1 TABLET EVERY DAY 30 tablet 5    atorvastatin (LIPITOR) 80 MG tablet Take 80 mg by mouth once daily.      BONIVA 150 mg tablet TAKE 1 TABLET (150 MG TOTAL) BY MOUTH EVERY 30 DAYS. 4 tablet 3    clopidogrel (PLAVIX) 75 mg tablet TAKE 1 TABLET EVERY DAY 90 tablet 0    duloxetine (CYMBALTA) 60 MG capsule Take 1 capsule (60 mg total) by mouth once daily. 30 capsule 11    hydrocodone-acetaminophen 10-325mg (NORCO)  mg Tab Take 1 tablet by mouth 2 (two) times daily as needed.      montelukast (SINGULAIR) 10 mg tablet TAKE 1 TABLET EVERY DAY 30 tablet 5    nitroGLYCERIN (NITROSTAT) 0.4 MG SL tablet Place 0.4 mg under the tongue every 5 (five) minutes as needed for Chest pain.      pantoprazole (PROTONIX) 40 MG tablet Take 40 mg by mouth once daily.      [DISCONTINUED] azelastine (ASTELIN) 137 mcg nasal spray 1 spray by Nasal route 2 (two) times daily.      [DISCONTINUED] aspirin 81 MG Chew Take 81 mg by mouth once daily.      [DISCONTINUED] clopidogrel (PLAVIX) 75 mg tablet TAKE 1 TABLET EVERY DAY 90 tablet 3     No current facility-administered medications on file prior to visit.   "          ROS:  GENERAL: No fever, chills,  or significant weight changes.   CARDIOVASCULAR: Denies chest pain, PND, orthopnea or reduced exercise tolerance.  ABDOMEN: Appetite fine. Denies diarrhea, abdominal pain, hematemesis or blood in stool.  URINARY: No flank pain, dysuria or hematuria.      OBJECTIVE:     Vitals:    05/22/17 0845   BP: 130/76   Pulse: 69   Temp: 98 °F (36.7 °C)   Weight: 66.2 kg (145 lb 15.1 oz)   Height: 5' 5" (1.651 m)     Wt Readings from Last 3 Encounters:   05/22/17 66.2 kg (145 lb 15.1 oz)   03/17/17 68.8 kg (151 lb 9.1 oz)   01/26/17 69.3 kg (152 lb 12.5 oz)     APPEARANCE: Well nourished, well developed, in no acute distress.    HEAD: Normocephalic.  Atraumatic.  No sinus tenderness.  EYES:   Right eye: Pupil reactive.  Conjunctiva clear.    Left eye: Pupil reactive.  Conjunctiva clear.    Both fundi:  Grossly normal to nondilated exam. EOMI.    EARS: TM's intact. Light reflex normal. No retraction or perforation.    NOSE:  clear.  MOUTH & THROAT:  No pharyngeal erythema or exudate. No lesions.  NECK: Supple. No bruits.  No JVD.  No cervical lymphadenopathy.  No thyromegaly.    CHEST: Breath sounds clear bilaterally.  Normal respiratory effort  CARDIOVASCULAR: Normal rate.  Regular rhythm.  No murmurs.  No rub.  No gallops.  ABDOMEN: Bowel sounds normal.  Soft.  No tenderness.  No organomegaly.  PERIPHERAL VASCULAR: No cyanosis.  No clubbing.  No edema.  NEUROLOGIC: No focal findings.  MENTAL STATUS: Alert.  Oriented x 3.    Marychuy was seen today for transitional care, nausea and dizziness.    Diagnoses and all orders for this visit:    Other chest pain  -     US Abdomen Complete; Future    Polypharmacy    Fatigue, unspecified type      Follow-up 1 month.  Bring in all her medications so that we can confirm what she is actually taking.    Transitional Care Note    Family and/or Caretaker present at visit?  No.  Diagnostic tests reviewed/disposition: No diagnosic tests pending after " this hospitalization.  Disease/illness education: yes  Home health/community services discussion/referrals: Patient does not have home health established from hospital visit.  They do not need home health.  If needed, we will set up home health for the patient.   Establishment or re-establishment of referral orders for community resources: No other necessary community resources.   Discussion with other health care providers: No discussion with other health care providers necessary.

## 2017-05-29 ENCOUNTER — TELEPHONE (OUTPATIENT)
Dept: RADIOLOGY | Facility: HOSPITAL | Age: 77
End: 2017-05-29

## 2017-05-30 ENCOUNTER — HOSPITAL ENCOUNTER (OUTPATIENT)
Dept: RADIOLOGY | Facility: HOSPITAL | Age: 77
Discharge: HOME | End: 2017-05-30
Attending: FAMILY MEDICINE
Payer: MEDICARE

## 2017-05-30 DIAGNOSIS — R07.89 OTHER CHEST PAIN: ICD-10-CM

## 2017-05-30 PROCEDURE — 76700 US EXAM ABDOM COMPLETE: CPT | Mod: 26,,, | Performed by: RADIOLOGY

## 2017-05-30 PROCEDURE — 76700 US EXAM ABDOM COMPLETE: CPT | Mod: TC,PO

## 2017-06-12 PROCEDURE — 99495 TRANSJ CARE MGMT MOD F2F 14D: CPT | Mod: S$GLB,,, | Performed by: FAMILY MEDICINE

## 2017-06-15 RX ORDER — BENZONATATE 100 MG/1
CAPSULE ORAL
Qty: 30 CAPSULE | Refills: 0 | Status: SHIPPED | OUTPATIENT
Start: 2017-06-15 | End: 2020-01-23

## 2017-06-17 RX ORDER — BENZONATATE 100 MG/1
CAPSULE ORAL
Qty: 30 CAPSULE | Refills: 0 | Status: SHIPPED | OUTPATIENT
Start: 2017-06-17 | End: 2017-06-19 | Stop reason: SDUPTHER

## 2017-06-19 RX ORDER — BENZONATATE 100 MG/1
CAPSULE ORAL
Qty: 30 CAPSULE | Refills: 0 | Status: SHIPPED | OUTPATIENT
Start: 2017-06-19 | End: 2017-11-07 | Stop reason: SDUPTHER

## 2017-06-19 RX ORDER — BENZONATATE 100 MG/1
CAPSULE ORAL
Qty: 30 CAPSULE | Refills: 0 | Status: CANCELLED | OUTPATIENT
Start: 2017-06-19

## 2017-08-06 RX ORDER — IBANDRONATE SODIUM 150 MG/1
TABLET, FILM COATED ORAL
Qty: 3 TABLET | Refills: 3 | Status: SHIPPED | OUTPATIENT
Start: 2017-08-06 | End: 2018-01-03

## 2017-09-12 RX ORDER — AMLODIPINE BESYLATE 2.5 MG/1
TABLET ORAL
Qty: 30 TABLET | Refills: 5 | Status: SHIPPED | OUTPATIENT
Start: 2017-09-12 | End: 2018-09-11 | Stop reason: SDUPTHER

## 2017-09-12 RX ORDER — MONTELUKAST SODIUM 10 MG/1
TABLET ORAL
Qty: 30 TABLET | Refills: 5 | Status: SHIPPED | OUTPATIENT
Start: 2017-09-12 | End: 2018-06-12 | Stop reason: SDUPTHER

## 2017-09-21 ENCOUNTER — OFFICE VISIT (OUTPATIENT)
Dept: FAMILY MEDICINE | Facility: CLINIC | Age: 77
End: 2017-09-21
Payer: MEDICARE

## 2017-09-21 VITALS
BODY MASS INDEX: 28.87 KG/M2 | HEIGHT: 60 IN | DIASTOLIC BLOOD PRESSURE: 77 MMHG | WEIGHT: 147.06 LBS | TEMPERATURE: 98 F | HEART RATE: 87 BPM | SYSTOLIC BLOOD PRESSURE: 133 MMHG

## 2017-09-21 DIAGNOSIS — J32.9 SINUSITIS, UNSPECIFIED CHRONICITY, UNSPECIFIED LOCATION: Primary | ICD-10-CM

## 2017-09-21 DIAGNOSIS — R11.0 NAUSEA: ICD-10-CM

## 2017-09-21 DIAGNOSIS — R09.82 PND (POST-NASAL DRIP): ICD-10-CM

## 2017-09-21 PROCEDURE — 3008F BODY MASS INDEX DOCD: CPT | Mod: S$GLB,,, | Performed by: NURSE PRACTITIONER

## 2017-09-21 PROCEDURE — 99213 OFFICE O/P EST LOW 20 MIN: CPT | Mod: S$GLB,,, | Performed by: NURSE PRACTITIONER

## 2017-09-21 PROCEDURE — 1159F MED LIST DOCD IN RCRD: CPT | Mod: S$GLB,,, | Performed by: NURSE PRACTITIONER

## 2017-09-21 PROCEDURE — 3075F SYST BP GE 130 - 139MM HG: CPT | Mod: S$GLB,,, | Performed by: NURSE PRACTITIONER

## 2017-09-21 PROCEDURE — 99999 PR PBB SHADOW E&M-EST. PATIENT-LVL IV: CPT | Mod: PBBFAC,,, | Performed by: NURSE PRACTITIONER

## 2017-09-21 PROCEDURE — 3078F DIAST BP <80 MM HG: CPT | Mod: S$GLB,,, | Performed by: NURSE PRACTITIONER

## 2017-09-21 RX ORDER — ONDANSETRON HYDROCHLORIDE 8 MG/1
8 TABLET, FILM COATED ORAL EVERY 8 HOURS PRN
Qty: 15 TABLET | Refills: 0 | Status: SHIPPED | OUTPATIENT
Start: 2017-09-21 | End: 2017-09-26

## 2017-09-21 RX ORDER — CETIRIZINE HYDROCHLORIDE 10 MG/1
10 TABLET ORAL DAILY
Qty: 10 TABLET | Refills: 0 | Status: SHIPPED | OUTPATIENT
Start: 2017-09-21 | End: 2020-01-23

## 2017-09-21 RX ORDER — DOXYCYCLINE 100 MG/1
100 CAPSULE ORAL 2 TIMES DAILY
Qty: 20 CAPSULE | Refills: 0 | Status: SHIPPED | OUTPATIENT
Start: 2017-09-21 | End: 2017-10-01

## 2017-09-21 NOTE — PROGRESS NOTES
Subjective:       Patient ID: Marychuy Bruce is a 77 y.o. female.    Chief Complaint: Generalized Body Aches; Cough; and Nausea    Cough   This is a new problem. The current episode started in the past 7 days. The problem has been gradually worsening. The cough is productive of sputum. Associated symptoms include nasal congestion, postnasal drip and rhinorrhea. Pertinent negatives include no chest pain, chills, ear congestion, ear pain, fever, headaches, heartburn, hemoptysis, myalgias, sore throat, shortness of breath, sweats, weight loss or wheezing. Associated symptoms comments: nausea. Nothing aggravates the symptoms. She has tried a beta-agonist inhaler and steroid inhaler for the symptoms. The treatment provided no relief. Her past medical history is significant for bronchitis, COPD and environmental allergies (chronic rhinitis). There is no history of asthma, bronchiectasis, emphysema or pneumonia.     Past Medical History:   Diagnosis Date    Anxiety state 1/23/2014    CAD (coronary artery disease)     Carotid stenosis 3/25/2013    Overview:  Calcific, nonobstructive (1/31/12, 3/19/13, [facility])     Chronic back pain     Chronic bronchitis     Chronic gastritis     Chronic rhinitis     DDD (degenerative disc disease), cervical     Degeneration of lumbar or lumbosacral intervertebral disc 7/2/2014    Diverticulosis     Fractures 2015    right ring finger    History of stroke     History of stroke     ?    HTN (hypertension)     Hyperlipidemia     Inflammatory polyps of colon 3/17/15    Lumbosacral spondylosis 7/2/2014    Migraine headache     Mild aortic stenosis     Osteoporosis     Syncope     Thyroid nodule      Social History     Social History    Marital status:      Spouse name: N/A    Number of children: N/A    Years of education: N/A     Occupational History         Social History Main Topics    Smoking status: Never Smoker    Smokeless tobacco: Not on file     Alcohol use No    Drug use: Unknown    Sexual activity: Not on file     Past Surgical History:   Procedure Laterality Date    CARDIAC CATHETERIZATION  12/11/2014    LAD mid-vessel eccentric stenosis of around 50%    CARPAL TUNNEL RELEASE Right     COLONOSCOPY W/ BIOPSIES  8/26/2010    benign mucosa    ESOPHAGOGASTRODUODENOSCOPY  2/15/2012    ESOPHAGOGASTRODUODENOSCOPY  3/2015    FINE NEEDLE ASPIRATION      thyroid - colloid    FINE NEEDLE ASPIRATION  1/15/2014    Thyroid- benign    HYSTERECTOMY      Lower extremitry angiogram  06/06/2014    no significant disease       Review of Systems   Constitutional: Negative.  Negative for chills, fever and weight loss.   HENT: Positive for postnasal drip and rhinorrhea. Negative for ear pain and sore throat.    Eyes: Negative.    Respiratory: Positive for cough. Negative for hemoptysis, shortness of breath and wheezing.    Cardiovascular: Negative.  Negative for chest pain.   Gastrointestinal: Positive for nausea. Negative for heartburn.   Endocrine: Negative.    Genitourinary: Negative.    Musculoskeletal: Negative.  Negative for myalgias.   Skin: Negative.    Allergic/Immunologic: Positive for environmental allergies (chronic rhinitis).   Neurological: Negative.  Negative for headaches.   Psychiatric/Behavioral: Negative.        Objective:      Physical Exam   Constitutional: She is oriented to person, place, and time. She appears well-developed and well-nourished.   HENT:   Head: Normocephalic.   Right Ear: Hearing, tympanic membrane, external ear and ear canal normal.   Left Ear: Hearing, tympanic membrane, external ear and ear canal normal.   Nose: Mucosal edema and rhinorrhea present. Right sinus exhibits maxillary sinus tenderness. Right sinus exhibits no frontal sinus tenderness. Left sinus exhibits maxillary sinus tenderness. Left sinus exhibits no frontal sinus tenderness.   Mouth/Throat: Uvula is midline, oropharynx is clear and moist and mucous membranes  are normal.   Eyes: Conjunctivae are normal. Pupils are equal, round, and reactive to light.   Neck: Normal range of motion. Neck supple.   Cardiovascular: Normal rate, regular rhythm and normal heart sounds.    Pulmonary/Chest: Effort normal.   Abdominal: Soft. Bowel sounds are normal.   Musculoskeletal: Normal range of motion.   Neurological: She is alert and oriented to person, place, and time.   Skin: Skin is warm and dry. Capillary refill takes 2 to 3 seconds.   Psychiatric: She has a normal mood and affect. Her behavior is normal. Judgment and thought content normal.   Nursing note and vitals reviewed.      Assessment:       1. Sinusitis, unspecified chronicity, unspecified location    2. Nausea    3. PND (post-nasal drip)        Plan:           Marychuy was seen today for generalized body aches, cough and nausea.    Diagnoses and all orders for this visit:    Sinusitis, unspecified chronicity, unspecified location  Nausea  PND (post-nasal drip)  -     doxycycline (MONODOX) 100 MG capsule; Take 1 capsule (100 mg total) by mouth 2 (two) times daily.  -     cetirizine (ZYRTEC) 10 MG tablet; Take 1 tablet (10 mg total) by mouth once daily.  -     ondansetron (ZOFRAN) 8 MG tablet; Take 1 tablet (8 mg total) by mouth every 8 (eight) hours as needed.

## 2017-11-02 RX ORDER — MONTELUKAST SODIUM 10 MG/1
TABLET ORAL
Qty: 30 TABLET | Refills: 5 | Status: SHIPPED | OUTPATIENT
Start: 2017-11-02 | End: 2017-11-07 | Stop reason: DRUGHIGH

## 2017-11-03 RX ORDER — CETIRIZINE HYDROCHLORIDE 10 MG/1
TABLET ORAL
Qty: 10 TABLET | Refills: 0 | OUTPATIENT
Start: 2017-11-03

## 2017-11-03 RX ORDER — DOXYCYCLINE 100 MG/1
CAPSULE ORAL
Qty: 20 CAPSULE | Refills: 0 | OUTPATIENT
Start: 2017-11-03

## 2017-11-06 RX ORDER — PREGABALIN 50 MG/1
CAPSULE ORAL
Qty: 90 CAPSULE | Refills: 3 | OUTPATIENT
Start: 2017-11-06

## 2017-11-07 ENCOUNTER — PATIENT OUTREACH (OUTPATIENT)
Dept: ADMINISTRATIVE | Facility: CLINIC | Age: 77
End: 2017-11-07

## 2017-11-07 RX ORDER — METOPROLOL SUCCINATE 50 MG/1
50 TABLET, EXTENDED RELEASE ORAL DAILY
COMMUNITY
End: 2023-12-05

## 2017-11-07 RX ORDER — RANOLAZINE 500 MG/1
500 TABLET, EXTENDED RELEASE ORAL 2 TIMES DAILY
COMMUNITY
End: 2019-02-25 | Stop reason: ALTCHOICE

## 2017-11-07 NOTE — PATIENT INSTRUCTIONS
Bleeding or Hematoma After Cardiac Catheterization  You recently had cardiac catheterization. A catheter was put into your body through a puncture site in your groin or arm. You now have bleeding from this site. When bleeding occurs, it may drip or spurt from the site. Or it may collect in a lump (hematoma) under the skin. In the emergency department, pressure will be put on the site to stop bleeding. You will be sent home when the bleeding stops. To prevent repeat bleeding, take some precautions at home. If the bleeding starts again, follow the advice below.    Home care  For the next 48 hours:  · Don't do any strenuous activity.  · Don't climb stairs.  · Don't lift anything heavy.  · If the puncture site is in your arm or wrist, don't lie on that arm.  · If your puncture site is in the groin, don't strain at bowel movements.  · Don't scrub the site when bathing. It is fine to get it wet after your healthcare provider says it is OK, but don't scrub it or massage it.  If bleeding happens again, call 911. Take the following steps to stop the bleeding until help arrives:  · Lie on your back.  · Place a clean cloth or gauze pad over the puncture site. Then hold firm pressure right on the site. Or have someone else apply firm pressure using the gauze pad or washcloth.  · Keep your arm straight and raised above the level of your heart if the site is in your arm or wrist. If the site is in your groin, have someone else hold pressure on the site. If you dont have someone to do this, put a 5-pound bag of rice or flour on the site and apply pressure with your hand over the bag.  · Don't press too hard! If you press too hard, your leg and foot (or arm and hand) will not get blood flow and the skin under your toenails or fingernails may turn white. The skin should look pink, like the toenails on your other foot. When you (or someone) presses on the toenail it will turn white, but when you stop pressing on the nail it will  "turn pink again. This is called "capillary refill." If there is someone with you, he or she can check it.  · If your toes, foot, or leg start feeling numb, tingly, or cold, ease up on the pressure, because you are probably pressing too hard.  · As soon as emergency care arrives, they will take over your care.  Follow-up care  Follow up with your healthcare provider, or as advised. Ask your healthcare provider for a contact number to call.  Call 911  Call 911 if any of these occur:  · Chest pain or pressure  · Any bleeding from the site  · Feeling weak or faint  · Trouble breathing  · Lump (hematoma) is quickly getting larger  · Coolness, numbness, tingling, or skin color changes in the leg or arm with the puncture site  When to seek medical advice  Call your healthcare provider right away if any of these occur:  · Increased pain, redness, swelling, or drainage from the puncture site  · Nausea or vomiting  Date Last Reviewed: 1/11/2016  © 3468-6476 [employer]. [address] [place] [address], [place] [address]. All rights reserved. This information is not intended as a substitute for professional medical care. Always follow your healthcare professional's instructions.        "

## 2018-01-03 ENCOUNTER — LAB VISIT (OUTPATIENT)
Dept: LAB | Facility: HOSPITAL | Age: 78
End: 2018-01-03
Attending: FAMILY MEDICINE
Payer: MEDICARE

## 2018-01-03 ENCOUNTER — OFFICE VISIT (OUTPATIENT)
Dept: FAMILY MEDICINE | Facility: CLINIC | Age: 78
End: 2018-01-03
Payer: MEDICARE

## 2018-01-03 VITALS
BODY MASS INDEX: 24.03 KG/M2 | HEIGHT: 66 IN | DIASTOLIC BLOOD PRESSURE: 77 MMHG | HEART RATE: 78 BPM | SYSTOLIC BLOOD PRESSURE: 127 MMHG | TEMPERATURE: 98 F | WEIGHT: 149.5 LBS

## 2018-01-03 DIAGNOSIS — I25.10 NON-OCCLUSIVE CORONARY ARTERY DISEASE: ICD-10-CM

## 2018-01-03 DIAGNOSIS — J41.0 SIMPLE CHRONIC BRONCHITIS: ICD-10-CM

## 2018-01-03 DIAGNOSIS — K21.9 GASTROESOPHAGEAL REFLUX DISEASE, ESOPHAGITIS PRESENCE NOT SPECIFIED: ICD-10-CM

## 2018-01-03 DIAGNOSIS — Z00.00 ROUTINE HISTORY AND PHYSICAL EXAMINATION OF ADULT: Primary | ICD-10-CM

## 2018-01-03 DIAGNOSIS — I25.119 ATHEROSCLEROSIS OF NATIVE CORONARY ARTERY OF NATIVE HEART WITH ANGINA PECTORIS: ICD-10-CM

## 2018-01-03 DIAGNOSIS — I10 ESSENTIAL HYPERTENSION: ICD-10-CM

## 2018-01-03 DIAGNOSIS — F33.0 MILD EPISODE OF RECURRENT MAJOR DEPRESSIVE DISORDER: ICD-10-CM

## 2018-01-03 DIAGNOSIS — E78.5 HYPERLIPIDEMIA, UNSPECIFIED HYPERLIPIDEMIA TYPE: ICD-10-CM

## 2018-01-03 LAB
CHOLEST SERPL-MCNC: 141 MG/DL
CHOLEST/HDLC SERPL: 2.5 {RATIO}
HDLC SERPL-MCNC: 57 MG/DL
HDLC SERPL: 40.4 %
LDLC SERPL CALC-MCNC: 75 MG/DL
NONHDLC SERPL-MCNC: 84 MG/DL
TRIGL SERPL-MCNC: 45 MG/DL

## 2018-01-03 PROCEDURE — 36415 COLL VENOUS BLD VENIPUNCTURE: CPT | Mod: PO

## 2018-01-03 PROCEDURE — 99499 UNLISTED E&M SERVICE: CPT | Mod: S$GLB,,, | Performed by: FAMILY MEDICINE

## 2018-01-03 PROCEDURE — 99214 OFFICE O/P EST MOD 30 MIN: CPT | Mod: S$GLB,,, | Performed by: FAMILY MEDICINE

## 2018-01-03 PROCEDURE — 90662 IIV NO PRSV INCREASED AG IM: CPT | Mod: S$GLB,,, | Performed by: FAMILY MEDICINE

## 2018-01-03 PROCEDURE — 80061 LIPID PANEL: CPT

## 2018-01-03 PROCEDURE — G0008 ADMIN INFLUENZA VIRUS VAC: HCPCS | Mod: S$GLB,,, | Performed by: FAMILY MEDICINE

## 2018-01-03 PROCEDURE — 99999 PR PBB SHADOW E&M-EST. PATIENT-LVL III: CPT | Mod: PBBFAC,,, | Performed by: FAMILY MEDICINE

## 2018-01-03 RX ORDER — PANTOPRAZOLE SODIUM 40 MG/1
40 TABLET, DELAYED RELEASE ORAL 2 TIMES DAILY
Qty: 180 TABLET | Refills: 3 | Status: SHIPPED | OUTPATIENT
Start: 2018-01-03 | End: 2018-01-15 | Stop reason: CLARIF

## 2018-01-03 RX ORDER — MELOXICAM 7.5 MG/1
TABLET ORAL
COMMUNITY
Start: 2017-12-11 | End: 2018-01-03

## 2018-01-04 NOTE — PROGRESS NOTES
Patient presents physical exam.  Hospitalization in November with heart catheterization without evidence of hemodynamically significant stenosis though she did have some nonocclusive disease.  Simple bronchitis followed by pulmonary stable.  Depression stable.  Hypertension controlled.  Reflux with some symptoms despite Protonix daily.  Previous EGD with gastritis    Past Medical History:  Past Medical History:   Diagnosis Date    Anxiety state 1/23/2014    CAD (coronary artery disease)     Carotid stenosis 3/25/2013    Overview:  Calcific, nonobstructive (1/31/12, 3/19/13, [facility])     Chronic back pain     Chronic bronchitis     Chronic gastritis     Chronic rhinitis     Closed displaced fracture of middle phalanx of right ring finger 1/7/2015    DDD (degenerative disc disease), cervical     Degeneration of lumbar or lumbosacral intervertebral disc 7/2/2014    Diverticulosis     Fractures 2015    right ring finger    GERD (gastroesophageal reflux disease)     History of stroke     History of stroke     ?    HTN (hypertension)     Hyperlipidemia     Inflammatory polyps of colon 3/17/15    Injury of extensor tendon of hand 1/7/2015    Lumbosacral spondylosis 7/2/2014    Migraine headache     Mild aortic stenosis     Osteoporosis     Syncope     Thyroid nodule      Past Surgical History:   Procedure Laterality Date    CARDIAC CATHETERIZATION  12/11/2014    LAD mid-vessel eccentric stenosis of around 50%    CARDIAC CATHETERIZATION  11/14/2017    Eccentric stenosis noted in the mid LAD of around 40%. IFR performed showing no evidence of hemodynamically significant stenosis.    CARPAL TUNNEL RELEASE Right     COLONOSCOPY W/ BIOPSIES  8/26/2010    benign mucosa    ESOPHAGOGASTRODUODENOSCOPY  2/15/2012    ESOPHAGOGASTRODUODENOSCOPY  3/2015    FINE NEEDLE ASPIRATION      thyroid - colloid    FINE NEEDLE ASPIRATION  1/15/2014    Thyroid- benign    HYSTERECTOMY      Lower extremitry angiogram   06/06/2014    no significant disease     Social History     Social History    Marital status:      Spouse name: N/A    Number of children: N/A    Years of education: N/A     Occupational History    Not on file.     Social History Main Topics    Smoking status: Never Smoker    Smokeless tobacco: Not on file    Alcohol use No    Drug use: Unknown    Sexual activity: Not on file     Other Topics Concern    Not on file     Social History Narrative    No narrative on file     Family History   Problem Relation Age of Onset    Hypertension Mother     Throat cancer Mother     Heart attack Mother 60     MI    Hypertension Sister     Hypertension Father     Thyroid disease Sister     Stroke Sister      Review of patient's allergies indicates:   Allergen Reactions    Codeine     Sertraline      Unknown^    Sulfa (sulfonamide antibiotics)      Current Outpatient Prescriptions on File Prior to Visit   Medication Sig Dispense Refill    ADVAIR DISKUS 100-50 mcg/dose diskus inhaler INHALE 1 PUFF INTO THE LUNGS TWICE A DAY 1 each 5    albuterol (PROAIR HFA) 90 mcg/actuation inhaler Inhale 2 puffs into the lungs every 6 (six) hours as needed for Wheezing. 3 Inhaler 3    amlodipine (NORVASC) 2.5 MG tablet TAKE 1 TABLET EVERY DAY 30 tablet 5    aspirin (ECOTRIN) 81 MG EC tablet Take 81 mg by mouth.      atorvastatin (LIPITOR) 80 MG tablet Take 80 mg by mouth once daily.      benzonatate (TESSALON) 100 MG capsule TAKE 1 CAPSULE BY MOUTH 3TIMES A DAY AS NEEDED FORCOUGH 30 capsule 0    clopidogrel (PLAVIX) 75 mg tablet TAKE 1 TABLET EVERY DAY 90 tablet 0    duloxetine (CYMBALTA) 60 MG capsule Take 1 capsule (60 mg total) by mouth once daily. 30 capsule 11    metoprolol succinate (TOPROL-XL) 50 MG 24 hr tablet Take 50 mg by mouth once daily.      montelukast (SINGULAIR) 10 mg tablet TAKE 1 TABLET EVERY DAY 30 tablet 5    nitroGLYCERIN (NITROSTAT) 0.4 MG SL tablet Place 0.4 mg under the tongue every 5  "(five) minutes as needed for Chest pain.      ranolazine (RANEXA) 500 MG Tb12 Take 500 mg by mouth 2 (two) times daily.      umeclidinium (INCRUSE ELLIPTA) 62.5 mcg/actuation DsDv Inhale 1 puff into the lungs once as needed. Controller      [DISCONTINUED] pantoprazole (PROTONIX) 40 MG tablet Take 40 mg by mouth 2 (two) times daily.      cetirizine (ZYRTEC) 10 MG tablet Take 1 tablet (10 mg total) by mouth once daily. 10 tablet 0    [DISCONTINUED] aclidinium bromide (TUDORZA) 400 mcg/actuation AePB Inhale 1 puff into the lungs once daily.      [DISCONTINUED] fluticasone (FLONASE) 50 mcg/actuation nasal spray 2 sprays by Nasal route once daily.      [DISCONTINUED] hydrocodone-acetaminophen 10-325mg (NORCO)  mg Tab Take 1 tablet by mouth 2 (two) times daily as needed.      [DISCONTINUED] ibandronate (BONIVA) 150 mg tablet TAKE 1 TABLET BY MOUTH EVERY 30 DAYS. 3 tablet 3    [DISCONTINUED] pregabalin (LYRICA) 50 MG capsule Take 50 mg by mouth once daily.       No current facility-administered medications on file prior to visit.            ROS:  GENERAL: No fever, chills,  or significant weight changes.   CARDIOVASCULAR: Denies exertional chest pain, PND, orthopnea.  ABDOMEN: Appetite fine. Denies diarrhea, abdominal pain, hematemesis or blood in stool.  URINARY: No flank pain, dysuria or hematuria.      OBJECTIVE:     Vitals:    01/03/18 0931   BP: 127/77   Pulse: 78   Temp: 97.9 °F (36.6 °C)   Weight: 67.8 kg (149 lb 7.6 oz)   Height: 5' 5.5" (1.664 m)     Wt Readings from Last 3 Encounters:   01/03/18 67.8 kg (149 lb 7.6 oz)   09/21/17 66.7 kg (147 lb 0.8 oz)   05/22/17 66.2 kg (145 lb 15.1 oz)     APPEARANCE: Well nourished, well developed, in no acute distress.    HEAD: Normocephalic.  Atraumatic.  No sinus tenderness.  EYES:   Right eye: Pupil reactive.  Conjunctiva clear.    Left eye: Pupil reactive.  Conjunctiva clear.    Both fundi:  Grossly normal to nondilated exam. EOMI.    EARS: TM's intact. " Light reflex normal. No retraction or perforation.    NOSE:  clear.  MOUTH & THROAT:  No pharyngeal erythema or exudate. No lesions.  NECK: Supple. No bruits.  No JVD.  No cervical lymphadenopathy.  No thyromegaly.    CHEST: Breath sounds clear bilaterally.  Normal respiratory effort  CARDIOVASCULAR: Normal rate.  Regular rhythm.  No murmurs.  No rub.  No gallops.  ABDOMEN: Bowel sounds normal.  Soft.  No tenderness.  No organomegaly.  PERIPHERAL VASCULAR: No cyanosis.  No clubbing.  No edema.  NEUROLOGIC: No focal findings.  MENTAL STATUS: Alert.  Oriented x 3.      Marychuy was seen today for hospital follow up.    Diagnoses and all orders for this visit:    Routine history and physical examination of adult    Simple chronic bronchitis    Hyperlipidemia, unspecified hyperlipidemia type  -     Lipid panel; Future    Atherosclerosis of native coronary artery of native heart with angina pectoris    Non-occlusive coronary artery disease    Mild episode of recurrent major depressive disorder    Essential hypertension    Gastroesophageal reflux disease, esophagitis presence not specified    Other orders  -     Influenza - High Dose (65+) (PF) (IM)  -     pantoprazole (PROTONIX) 40 MG tablet; Take 1 tablet (40 mg total) by mouth 2 (two) times daily.     increase Protonix twice a day.  Discontinue meloxicam.  Reviewed hospitalization at [place] in November.  Keep follow-up scheduled cardiology.  Follow-up symptoms not improved with above.

## 2018-01-10 ENCOUNTER — TELEPHONE (OUTPATIENT)
Dept: FAMILY MEDICINE | Facility: CLINIC | Age: 78
End: 2018-01-10

## 2018-01-10 NOTE — TELEPHONE ENCOUNTER
----- Message from Dash Augustine sent at 1/10/2018  9:39 AM CST -----  Contact: anne-marie Tavarez/ [facility]  He's calling in regards to receipt of a prior authorization request for: Protonox 40 mg, please send this back to: [phone]/ # [phone]

## 2018-01-12 ENCOUNTER — TELEPHONE (OUTPATIENT)
Dept: FAMILY MEDICINE | Facility: CLINIC | Age: 78
End: 2018-01-12

## 2018-01-15 ENCOUNTER — TELEPHONE (OUTPATIENT)
Dept: FAMILY MEDICINE | Facility: CLINIC | Age: 78
End: 2018-01-15

## 2018-01-15 RX ORDER — TRAZODONE HYDROCHLORIDE 50 MG/1
TABLET ORAL
Qty: 90 TABLET | Refills: 3 | Status: SHIPPED | OUTPATIENT
Start: 2018-01-15 | End: 2019-02-25 | Stop reason: ALTCHOICE

## 2018-01-15 RX ORDER — OMEPRAZOLE 40 MG/1
40 CAPSULE, DELAYED RELEASE ORAL 2 TIMES DAILY
Qty: 180 CAPSULE | Refills: 3 | Status: SHIPPED | OUTPATIENT
Start: 2018-01-15 | End: 2018-08-24 | Stop reason: SDUPTHER

## 2018-01-15 NOTE — TELEPHONE ENCOUNTER
[employer] requires use of formulary, with failure, before the would approve pantoprazle twice daily.  They recommend omeprazole, esomeprazole, lansoprazole, or rabepraxole, please advise.

## 2018-01-15 NOTE — TELEPHONE ENCOUNTER
----- Message from Cristy Ferrell sent at 1/15/2018 10:01 AM CST -----  Contact: [facility] ([place])  Calling for additional information needed for an medication appeal and please advise [phone] and fax# [phone]

## 2018-01-18 ENCOUNTER — TELEPHONE (OUTPATIENT)
Dept: FAMILY MEDICINE | Facility: CLINIC | Age: 78
End: 2018-01-18

## 2018-02-20 RX ORDER — DULOXETIN HYDROCHLORIDE 60 MG/1
CAPSULE, DELAYED RELEASE ORAL
Qty: 30 CAPSULE | Refills: 11 | Status: SHIPPED | OUTPATIENT
Start: 2018-02-20 | End: 2018-06-12 | Stop reason: SDUPTHER

## 2018-03-21 RX ORDER — CEPHALEXIN 250 MG/1
CAPSULE ORAL
Qty: 60 EACH | Refills: 5 | Status: SHIPPED | OUTPATIENT
Start: 2018-03-21 | End: 2018-08-24 | Stop reason: SDUPTHER

## 2018-04-23 RX ORDER — CLOPIDOGREL BISULFATE 75 MG/1
TABLET ORAL
Qty: 90 TABLET | Refills: 0 | Status: SHIPPED | OUTPATIENT
Start: 2018-04-23 | End: 2018-09-07

## 2018-05-18 RX ORDER — MONTELUKAST SODIUM 10 MG/1
TABLET ORAL
Qty: 30 TABLET | Refills: 11 | Status: SHIPPED | OUTPATIENT
Start: 2018-05-18 | End: 2018-06-12 | Stop reason: SDUPTHER

## 2018-06-11 RX ORDER — BENZONATATE 100 MG/1
CAPSULE ORAL
Qty: 30 CAPSULE | Refills: 0 | Status: SHIPPED | OUTPATIENT
Start: 2018-06-11 | End: 2018-09-07 | Stop reason: SDUPTHER

## 2018-06-12 RX ORDER — MONTELUKAST SODIUM 10 MG/1
10 TABLET ORAL DAILY
Qty: 90 TABLET | Refills: 1 | Status: SHIPPED | OUTPATIENT
Start: 2018-06-12 | End: 2019-01-14 | Stop reason: SDUPTHER

## 2018-06-12 RX ORDER — DULOXETIN HYDROCHLORIDE 60 MG/1
60 CAPSULE, DELAYED RELEASE ORAL DAILY
Qty: 90 CAPSULE | Refills: 1 | Status: SHIPPED | OUTPATIENT
Start: 2018-06-12 | End: 2018-09-07

## 2018-06-12 NOTE — TELEPHONE ENCOUNTER
----- Message from Marisol Hummel sent at 6/12/2018 10:48 AM CDT -----  Contact: [facility]   Caller request can 90 day supply be approved for pt on 6 medications. [phone]

## 2018-07-19 ENCOUNTER — PATIENT OUTREACH (OUTPATIENT)
Dept: ADMINISTRATIVE | Facility: HOSPITAL | Age: 78
End: 2018-07-19

## 2018-08-24 RX ORDER — FLUTICASONE PROPIONATE AND SALMETEROL 100; 50 UG/1; UG/1
POWDER RESPIRATORY (INHALATION)
Qty: 60 EACH | Refills: 5 | Status: SHIPPED | OUTPATIENT
Start: 2018-08-24 | End: 2024-02-21

## 2018-08-24 RX ORDER — OMEPRAZOLE 40 MG/1
40 CAPSULE, DELAYED RELEASE ORAL 2 TIMES DAILY
Qty: 180 CAPSULE | Refills: 5 | Status: SHIPPED | OUTPATIENT
Start: 2018-08-24 | End: 2018-10-02

## 2018-09-06 ENCOUNTER — PATIENT OUTREACH (OUTPATIENT)
Dept: ADMINISTRATIVE | Facility: HOSPITAL | Age: 78
End: 2018-09-06

## 2018-09-07 ENCOUNTER — OFFICE VISIT (OUTPATIENT)
Dept: FAMILY MEDICINE | Facility: CLINIC | Age: 78
End: 2018-09-07
Payer: MEDICARE

## 2018-09-07 VITALS
TEMPERATURE: 98 F | WEIGHT: 146 LBS | SYSTOLIC BLOOD PRESSURE: 112 MMHG | BODY MASS INDEX: 24.32 KG/M2 | DIASTOLIC BLOOD PRESSURE: 74 MMHG | HEIGHT: 65 IN | HEART RATE: 78 BPM

## 2018-09-07 DIAGNOSIS — G43.909 MIGRAINE WITHOUT STATUS MIGRAINOSUS, NOT INTRACTABLE, UNSPECIFIED MIGRAINE TYPE: Primary | ICD-10-CM

## 2018-09-07 DIAGNOSIS — R92.8 ABNORMAL MAMMOGRAM: ICD-10-CM

## 2018-09-07 DIAGNOSIS — I25.10 NON-OCCLUSIVE CORONARY ARTERY DISEASE: ICD-10-CM

## 2018-09-07 DIAGNOSIS — F33.0 MILD EPISODE OF RECURRENT MAJOR DEPRESSIVE DISORDER: ICD-10-CM

## 2018-09-07 DIAGNOSIS — F41.1 ANXIETY STATE: ICD-10-CM

## 2018-09-07 PROCEDURE — 3288F FALL RISK ASSESSMENT DOCD: CPT | Mod: CPTII,,, | Performed by: FAMILY MEDICINE

## 2018-09-07 PROCEDURE — 3074F SYST BP LT 130 MM HG: CPT | Mod: CPTII,,, | Performed by: FAMILY MEDICINE

## 2018-09-07 PROCEDURE — 1100F PTFALLS ASSESS-DOCD GE2>/YR: CPT | Mod: CPTII,,, | Performed by: FAMILY MEDICINE

## 2018-09-07 PROCEDURE — 99213 OFFICE O/P EST LOW 20 MIN: CPT | Mod: PBBFAC,PO | Performed by: FAMILY MEDICINE

## 2018-09-07 PROCEDURE — 99499 UNLISTED E&M SERVICE: CPT | Mod: S$GLB,,, | Performed by: FAMILY MEDICINE

## 2018-09-07 PROCEDURE — 3078F DIAST BP <80 MM HG: CPT | Mod: CPTII,,, | Performed by: FAMILY MEDICINE

## 2018-09-07 PROCEDURE — 99214 OFFICE O/P EST MOD 30 MIN: CPT | Mod: S$PBB,,, | Performed by: FAMILY MEDICINE

## 2018-09-07 PROCEDURE — 99999 PR PBB SHADOW E&M-EST. PATIENT-LVL III: CPT | Mod: PBBFAC,,, | Performed by: FAMILY MEDICINE

## 2018-09-07 RX ORDER — FLUOXETINE HYDROCHLORIDE 20 MG/1
20 CAPSULE ORAL DAILY
Qty: 7 CAPSULE | Refills: 0 | Status: SHIPPED | OUTPATIENT
Start: 2018-09-07 | End: 2018-09-14

## 2018-09-07 RX ORDER — FLUOXETINE HYDROCHLORIDE 40 MG/1
40 CAPSULE ORAL DAILY
Qty: 90 CAPSULE | Refills: 1 | Status: SHIPPED | OUTPATIENT
Start: 2018-09-07 | End: 2019-02-25

## 2018-09-07 RX ORDER — BROMFENAC SODIUM 0.7 MG/ML
1 SOLUTION/ DROPS OPHTHALMIC DAILY
COMMUNITY
Start: 2018-09-05 | End: 2019-02-25

## 2018-09-07 RX ORDER — FLUOXETINE HYDROCHLORIDE 40 MG/1
40 CAPSULE ORAL DAILY
Qty: 30 CAPSULE | Refills: 5 | Status: SHIPPED | OUTPATIENT
Start: 2018-09-07 | End: 2018-09-07

## 2018-09-07 RX ORDER — DIFLUPREDNATE OPHTHALMIC 0.5 MG/ML
EMULSION OPHTHALMIC
COMMUNITY
Start: 2018-09-05

## 2018-09-07 RX ORDER — DULOXETIN HYDROCHLORIDE 30 MG/1
CAPSULE, DELAYED RELEASE ORAL
Qty: 7 CAPSULE | Refills: 0 | Status: SHIPPED | OUTPATIENT
Start: 2018-09-07 | End: 2019-02-25 | Stop reason: ALTCHOICE

## 2018-09-07 NOTE — PROGRESS NOTES
Patient presents multiple somatic complaints.  Complains of a lot of nervousness, anxiety, depression.  No SI/HI.  Issues with .  No abuse.  Currently on Cymbalta, but not helping currently.  Previously was on Zoloft and Lexapro.  Recent cardiac catheterization with nonocclusive coronary artery disease noted.  She is due for her follow-up mammogram.  She missed her follow-up diagnostic and ultrasound.  She does get some headaches which have not changed from previously.  During the past week she noted some occasional discomfort at the right side , nothing today.  Denies significant appetite or weight changes.  She did have recent laboratory done through [facility] essentially unremarkable in addition to the heart catheterization.      Past Medical History:  Past Medical History:   Diagnosis Date    Anxiety state 1/23/2014    CAD (coronary artery disease)     Carotid stenosis 3/25/2013    Overview:  Calcific, nonobstructive (1/31/12, 3/19/13, [facility])     Chronic back pain     Chronic bronchitis     Chronic gastritis     Chronic rhinitis     Closed displaced fracture of middle phalanx of right ring finger 1/7/2015    DDD (degenerative disc disease), cervical     Degeneration of lumbar or lumbosacral intervertebral disc 7/2/2014    Diverticulosis     Fractures 2015    right ring finger    GERD (gastroesophageal reflux disease)     History of stroke     History of stroke     ?    HTN (hypertension)     Hyperlipidemia     Inflammatory polyps of colon 3/17/15    Injury of extensor tendon of hand 1/7/2015    Lumbosacral spondylosis 7/2/2014    Migraine headache     Mild aortic stenosis     Osteoporosis     Syncope     Thyroid nodule      Past Surgical History:   Procedure Laterality Date    CARDIAC CATHETERIZATION  12/11/2014    LAD mid-vessel eccentric stenosis of around 50%    CARDIAC CATHETERIZATION  11/14/2017    Eccentric stenosis noted in the mid LAD of around 40%. IFR performed showing  no evidence of hemodynamically significant stenosis.    CARDIAC CATHETERIZATION  07/26/2018    Non obstructive coronary disease    CARPAL TUNNEL RELEASE Right     CATARACT EXTRACTION      COLONOSCOPY W/ BIOPSIES  8/26/2010    benign mucosa    ESOPHAGOGASTRODUODENOSCOPY  2/15/2012    ESOPHAGOGASTRODUODENOSCOPY  3/2015    FINE NEEDLE ASPIRATION      thyroid - colloid    FINE NEEDLE ASPIRATION  1/15/2014    Thyroid- benign    HYSTERECTOMY      Lower extremitry angiogram  06/06/2014    no significant disease     Social History     Socioeconomic History    Marital status:      Spouse name: Not on file    Number of children: Not on file    Years of education: Not on file    Highest education level: Not on file   Social Needs    Financial resource strain: Not on file    Food insecurity - worry: Not on file    Food insecurity - inability: Not on file    Transportation needs - medical: Not on file    Transportation needs - non-medical: Not on file   Occupational History    Not on file   Tobacco Use    Smoking status: Never Smoker   Substance and Sexual Activity    Alcohol use: No    Drug use: Not on file    Sexual activity: Not on file   Other Topics Concern    Not on file   Social History Narrative    Not on file     Family History   Problem Relation Age of Onset    Hypertension Mother     Throat cancer Mother     Heart attack Mother 60        MI    Hypertension Sister     Hypertension Father     Thyroid disease Sister     Stroke Sister      Review of patient's allergies indicates:   Allergen Reactions    Codeine     Sertraline      Unknown^    Sulfa (sulfonamide antibiotics)      Current Outpatient Medications on File Prior to Visit   Medication Sig Dispense Refill    albuterol (PROAIR HFA) 90 mcg/actuation inhaler Inhale 2 puffs into the lungs every 6 (six) hours as needed for Wheezing. 3 Inhaler 3    amlodipine (NORVASC) 2.5 MG tablet TAKE 1 TABLET EVERY DAY 30 tablet 5     aspirin (ECOTRIN) 81 MG EC tablet Take 81 mg by mouth.      atorvastatin (LIPITOR) 80 MG tablet Take 80 mg by mouth once daily.      benzonatate (TESSALON) 100 MG capsule TAKE 1 CAPSULE BY MOUTH 3TIMES A DAY AS NEEDED FORCOUGH 30 capsule 0    difluprednate (DUREZOL) 0.05 % Drop ophthalmic solution PLACE 1 DROP INTO THE LEFT EYE 3 TIMES DAILY. START AFTER SURGERY      fluticasone-salmeterol 100-50 mcg/dose (ADVAIR DISKUS) 100-50 mcg/dose diskus inhaler INHALE 1 PUFF INTO THE LUNGS TWICE A DAY 60 each 5    metoprolol succinate (TOPROL-XL) 50 MG 24 hr tablet Take 50 mg by mouth once daily.      montelukast (SINGULAIR) 10 mg tablet Take 1 tablet (10 mg total) by mouth once daily. 90 tablet 1    nitroGLYCERIN (NITROSTAT) 0.4 MG SL tablet Place 0.4 mg under the tongue every 5 (five) minutes as needed for Chest pain.      omeprazole (PRILOSEC) 40 MG capsule Take 1 capsule (40 mg total) by mouth 2 (two) times daily. 180 capsule 5    PROLENSA 0.07 % Drop Apply 1 drop to eye once daily.       ranolazine (RANEXA) 500 MG Tb12 Take 500 mg by mouth 2 (two) times daily.      traZODone (DESYREL) 50 MG tablet TAKE 1 TABLET IN THE EVENING 90 tablet 3    umeclidinium (INCRUSE ELLIPTA) 62.5 mcg/actuation DsDv Inhale 1 puff into the lungs once as needed. Controller      [DISCONTINUED] clopidogrel (PLAVIX) 75 mg tablet TAKE 1 TABLET EVERY DAY 90 tablet 0    [DISCONTINUED] DULoxetine (CYMBALTA) 60 MG capsule Take 1 capsule (60 mg total) by mouth once daily. 90 capsule 1    cetirizine (ZYRTEC) 10 MG tablet Take 1 tablet (10 mg total) by mouth once daily. 10 tablet 0    [DISCONTINUED] benzonatate (TESSALON) 100 MG capsule TAKE 1 CAPSULE BY MOUTH 3TIMES A DAY AS NEEDED FORCOUGH 30 capsule 0     No current facility-administered medications on file prior to visit.            ROS:  GENERAL: No fever, chills,  or significant weight changes.   CARDIOVASCULAR: Denies exertional chest pain, PND, orthopnea or reduced exercise  "tolerance.  ABDOMEN: Appetite fine. Denies diarrhea, abdominal pain, hematemesis or blood in stool.  URINARY: No flank pain, dysuria or hematuria.      OBJECTIVE:     Vitals:    09/07/18 1009   BP: 112/74   Pulse: 78   Temp: 97.5 °F (36.4 °C)   Weight: 66.2 kg (146 lb)   Height: 5' 5" (1.651 m)     Wt Readings from Last 3 Encounters:   09/07/18 66.2 kg (146 lb)   01/03/18 67.8 kg (149 lb 7.6 oz)   09/21/17 66.7 kg (147 lb 0.8 oz)     APPEARANCE: Well nourished, well developed, in no acute distress.    HEAD: Normocephalic.  Atraumatic.  No sinus tenderness.  EYES:   Right eye: Pupil reactive.  Conjunctiva clear.    Left eye: Pupil reactive.  Conjunctiva clear.    Both fundi:  Grossly normal to nondilated exam. EOMI.    EARS: TM's intact. Light reflex normal. No retraction or perforation.    NOSE:  clear.  MOUTH & THROAT:  No pharyngeal erythema or exudate. No lesions.  NECK: Supple. No bruits.  No JVD.  No cervical lymphadenopathy.  No thyromegaly.    CHEST: Breath sounds clear bilaterally.  Normal respiratory effort  CARDIOVASCULAR: Normal rate.  Regular rhythm.  No murmurs.  No rub.  No gallops.  ABDOMEN: Bowel sounds normal.  Soft.  No tenderness.  No organomegaly.  PERIPHERAL VASCULAR: No cyanosis.  No clubbing.  No edema.  NEUROLOGIC: No focal findings.  MENTAL STATUS: Alert.  Oriented x 3.          Marychuy was seen today for hypertension and headache.    Diagnoses and all orders for this visit:    Migraine without status migrainosus, not intractable, unspecified migraine type    Mild episode of recurrent major depressive disorder    Anxiety state    Non-occlusive coronary artery disease    Abnormal mammogram  -     Mammo Digital Diagnostic Bilat with CAD; Future  -     US Breast Left Complete; Future    Other orders  -     Cancel: DXA Bone Density Spine And Hip; Future  -     FLUoxetine (PROZAC) 20 MG capsule; Take 1 capsule (20 mg total) by mouth once daily. for 7 days  -     Discontinue: FLUoxetine (PROZAC) " 40 MG capsule; Take 1 capsule (40 mg total) by mouth once daily.  -     DULoxetine (CYMBALTA) 30 MG capsule; Take 1 daily for 1 week, then stop     Taper Cymbalta and start fluoxetine.  Follow-up appointment 5 weeks.  Reviewed recent laboratory from [facility].  Follow-up as needed prior.

## 2018-09-11 RX ORDER — AMLODIPINE BESYLATE 2.5 MG/1
TABLET ORAL
Qty: 30 TABLET | Refills: 11 | Status: SHIPPED | OUTPATIENT
Start: 2018-09-11 | End: 2019-02-25

## 2018-09-13 ENCOUNTER — TELEPHONE (OUTPATIENT)
Dept: FAMILY MEDICINE | Facility: CLINIC | Age: 78
End: 2018-09-13

## 2018-09-13 NOTE — TELEPHONE ENCOUNTER
----- Message from Theresa Moreau sent at 9/13/2018 11:02 AM CDT -----  Contact: self [phone] or [phone]  States that she is returning call. Please call back at [phone] or [phone]//thank you acc

## 2018-09-13 NOTE — TELEPHONE ENCOUNTER
----- Message from Cristy Ferrell sent at 9/13/2018 12:04 PM CDT -----  Contact: pt  Calling in regards to a missed call and please advise [phone] or [phone]

## 2018-09-25 ENCOUNTER — TELEPHONE (OUTPATIENT)
Dept: FAMILY MEDICINE | Facility: CLINIC | Age: 78
End: 2018-09-25

## 2018-09-25 ENCOUNTER — HOSPITAL ENCOUNTER (OUTPATIENT)
Dept: RADIOLOGY | Facility: HOSPITAL | Age: 78
Discharge: HOME | End: 2018-09-25
Attending: FAMILY MEDICINE
Payer: MEDICARE

## 2018-09-25 ENCOUNTER — CLINICAL SUPPORT (OUTPATIENT)
Dept: CARDIOLOGY | Facility: CLINIC | Age: 78
End: 2018-09-25
Payer: MEDICARE

## 2018-09-25 ENCOUNTER — OFFICE VISIT (OUTPATIENT)
Dept: FAMILY MEDICINE | Facility: CLINIC | Age: 78
End: 2018-09-25
Payer: MEDICARE

## 2018-09-25 VITALS — WEIGHT: 142 LBS | HEIGHT: 65 IN | BODY MASS INDEX: 23.66 KG/M2

## 2018-09-25 VITALS
SYSTOLIC BLOOD PRESSURE: 125 MMHG | TEMPERATURE: 98 F | DIASTOLIC BLOOD PRESSURE: 78 MMHG | HEIGHT: 65 IN | WEIGHT: 142 LBS | BODY MASS INDEX: 23.66 KG/M2 | HEART RATE: 75 BPM

## 2018-09-25 DIAGNOSIS — R82.90 ABNORMAL URINE: Primary | ICD-10-CM

## 2018-09-25 DIAGNOSIS — R07.9 CHEST PAIN, UNSPECIFIED TYPE: ICD-10-CM

## 2018-09-25 DIAGNOSIS — R92.8 ABNORMAL MAMMOGRAM: ICD-10-CM

## 2018-09-25 DIAGNOSIS — R42 DIZZINESS: Primary | ICD-10-CM

## 2018-09-25 DIAGNOSIS — R55 FAINTING SPELL: ICD-10-CM

## 2018-09-25 DIAGNOSIS — R68.89 OTHER GENERAL SYMPTOMS AND SIGNS: ICD-10-CM
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
## 2018-09-25 DIAGNOSIS — R53.1 WEAKNESS: ICD-10-CM

## 2018-09-25 DIAGNOSIS — R51.9 NONINTRACTABLE HEADACHE, UNSPECIFIED CHRONICITY PATTERN, UNSPECIFIED HEADACHE TYPE: ICD-10-CM

## 2018-09-25 LAB
BACTERIA #/AREA URNS HPF: ABNORMAL /HPF
BILIRUB UR QL STRIP: NEGATIVE
CLARITY UR: CLEAR
COLOR UR: YELLOW
GLUCOSE UR QL STRIP: NEGATIVE
HGB UR QL STRIP: ABNORMAL
KETONES UR QL STRIP: NEGATIVE
LEUKOCYTE ESTERASE UR QL STRIP: ABNORMAL
MICROSCOPIC COMMENT: ABNORMAL
NITRITE UR QL STRIP: NEGATIVE
PH UR STRIP: 6 [PH] (ref 5–8)
PROT UR QL STRIP: NEGATIVE
RBC #/AREA URNS HPF: 1 /HPF (ref 0–4)
SP GR UR STRIP: 1.02 (ref 1–1.03)
SQUAMOUS #/AREA URNS HPF: 7 /HPF
URN SPEC COLLECT METH UR: ABNORMAL
WBC #/AREA URNS HPF: 8 /HPF (ref 0–5)

## 2018-09-25 PROCEDURE — 77062 BREAST TOMOSYNTHESIS BI: CPT | Mod: TC,PO

## 2018-09-25 PROCEDURE — 99999 PR PBB SHADOW E&M-EST. PATIENT-LVL V: CPT | Mod: PBBFAC,,, | Performed by: NURSE PRACTITIONER

## 2018-09-25 PROCEDURE — 93005 ELECTROCARDIOGRAM TRACING: CPT | Mod: PBBFAC,PO | Performed by: NURSE PRACTITIONER

## 2018-09-25 PROCEDURE — 3074F SYST BP LT 130 MM HG: CPT | Mod: CPTII,,, | Performed by: NURSE PRACTITIONER

## 2018-09-25 PROCEDURE — 76642 ULTRASOUND BREAST LIMITED: CPT | Mod: TC,PO,LT

## 2018-09-25 PROCEDURE — 81000 URINALYSIS NONAUTO W/SCOPE: CPT | Mod: PO

## 2018-09-25 PROCEDURE — 3078F DIAST BP <80 MM HG: CPT | Mod: CPTII,,, | Performed by: NURSE PRACTITIONER

## 2018-09-25 PROCEDURE — 1101F PT FALLS ASSESS-DOCD LE1/YR: CPT | Mod: CPTII,,, | Performed by: NURSE PRACTITIONER

## 2018-09-25 PROCEDURE — 76641 ULTRASOUND BREAST COMPLETE: CPT | Mod: TC,PO,LT

## 2018-09-25 PROCEDURE — 99213 OFFICE O/P EST LOW 20 MIN: CPT | Mod: S$PBB,,, | Performed by: NURSE PRACTITIONER

## 2018-09-25 PROCEDURE — 76642 ULTRASOUND BREAST LIMITED: CPT | Mod: 26,LT,, | Performed by: RADIOLOGY

## 2018-09-25 PROCEDURE — 77066 DX MAMMO INCL CAD BI: CPT | Mod: 26,,, | Performed by: RADIOLOGY

## 2018-09-25 PROCEDURE — 93010 ELECTROCARDIOGRAM REPORT: CPT | Mod: ,,, | Performed by: INTERNAL MEDICINE

## 2018-09-25 PROCEDURE — 77062 BREAST TOMOSYNTHESIS BI: CPT | Mod: 26,,, | Performed by: RADIOLOGY

## 2018-09-25 PROCEDURE — 99215 OFFICE O/P EST HI 40 MIN: CPT | Mod: PBBFAC,PO,25 | Performed by: NURSE PRACTITIONER

## 2018-09-25 RX ORDER — NIFEDIPINE 60 MG/1
TABLET, EXTENDED RELEASE ORAL
COMMUNITY
Start: 2018-09-11 | End: 2019-02-25

## 2018-09-25 RX ORDER — MECLIZINE HYDROCHLORIDE 25 MG/1
12.5 TABLET ORAL 2 TIMES DAILY PRN
Qty: 5 TABLET | Refills: 0 | Status: SHIPPED | OUTPATIENT
Start: 2018-09-25 | End: 2023-07-07

## 2018-09-25 NOTE — PROGRESS NOTES
Subjective:       Patient ID: Marychuy Bruce is a 78 y.o. female.    Chief Complaint: Dizziness (fell yesterday )    Dizziness:   Chronicity:  New  Onset:  Yesterday  Progression since onset:  Unchanged  Frequency:  Constantly  Severity:  Moderate  Duration:  Off/on all day  Dizziness characteristics:  Off-balance and lightheaded/impending faint  Frequency of Spells:  Daily   Associated symptoms: headaches, weakness, light-headedness, syncope and chest pain (yesterday).no hearing loss, no ear congestion, no ear pain, no fever, no tinnitus, no nausea, no vomiting, no diaphoresis, no aural fullness, no visual disturbances, no palpitations, no panic, no facial weakness, no slurred speech and no numbness in extremities.  Aggravated by:  Nothing  Treatments tried:  Nothing  Improvements on treatment:  No relief   PMH includes: cardiac surgery and anxiety.no strokes, no neurologic disease, no head trauma, no balance testing, no ear trauma, no ear surgery, no head trauma, no ear infections, no ear tubes, no environmental allergies, no MRI head and no CT head.    Past Medical History:   Diagnosis Date    Anxiety state 1/23/2014    CAD (coronary artery disease)     Carotid stenosis 3/25/2013    Overview:  Calcific, nonobstructive (1/31/12, 3/19/13, [facility])     Chronic back pain     Chronic bronchitis     Chronic gastritis     Chronic rhinitis     Closed displaced fracture of middle phalanx of right ring finger 1/7/2015    DDD (degenerative disc disease), cervical     Degeneration of lumbar or lumbosacral intervertebral disc 7/2/2014    Diverticulosis     Fractures 2015    right ring finger    GERD (gastroesophageal reflux disease)     History of stroke     History of stroke     ?    HTN (hypertension)     Hyperlipidemia     Inflammatory polyps of colon 3/17/15    Injury of extensor tendon of hand 1/7/2015    Lumbosacral spondylosis 7/2/2014    Migraine headache     Mild aortic stenosis      Osteoporosis     Syncope     Thyroid nodule      Social History     Socioeconomic History    Marital status:      Spouse name: Not on file    Number of children: Not on file    Years of education: Not on file    Highest education level: Not on file   Social Needs    Financial resource strain: Not on file    Food insecurity - worry: Not on file    Food insecurity - inability: Not on file    Transportation needs - medical: Not on file    Transportation needs - non-medical: Not on file   Occupational History    Not on file   Tobacco Use    Smoking status: Never Smoker   Substance and Sexual Activity    Alcohol use: No    Drug use: Not on file    Sexual activity: Not on file   Other Topics Concern    Not on file   Social History Narrative    Not on file     Past Surgical History:   Procedure Laterality Date    CARDIAC CATHETERIZATION  12/11/2014    LAD mid-vessel eccentric stenosis of around 50%    CARDIAC CATHETERIZATION  11/14/2017    Eccentric stenosis noted in the mid LAD of around 40%. IFR performed showing no evidence of hemodynamically significant stenosis.    CARDIAC CATHETERIZATION  07/26/2018    Non obstructive coronary disease    CARPAL TUNNEL RELEASE Right     CATARACT EXTRACTION      COLONOSCOPY W/ BIOPSIES  8/26/2010    benign mucosa    ESOPHAGOGASTRODUODENOSCOPY  2/15/2012    ESOPHAGOGASTRODUODENOSCOPY  3/2015    FINE NEEDLE ASPIRATION      thyroid - colloid    FINE NEEDLE ASPIRATION  1/15/2014    Thyroid- benign    HYSTERECTOMY      Lower extremitry angiogram  06/06/2014    no significant disease       Review of Systems   Constitutional: Negative for diaphoresis and fever.   HENT: Negative.  Negative for ear pain, hearing loss and tinnitus.    Eyes: Negative.    Respiratory: Negative.    Cardiovascular: Positive for chest pain (yesterday) and syncope. Negative for palpitations.   Gastrointestinal: Negative.  Negative for nausea and vomiting.   Endocrine: Negative.     Genitourinary: Negative.    Musculoskeletal: Negative.    Skin: Negative.    Allergic/Immunologic: Negative for environmental allergies.   Neurological: Positive for dizziness, weakness, light-headedness and headaches.   Psychiatric/Behavioral: Negative.        Objective:      Physical Exam   Constitutional: She is oriented to person, place, and time. She appears well-developed and well-nourished.   HENT:   Head: Normocephalic.   Right Ear: External ear normal.   Left Ear: External ear normal.   Nose: Nose normal.   Mouth/Throat: Oropharynx is clear and moist.   Eyes: Conjunctivae are normal. Pupils are equal, round, and reactive to light.   Neck: Normal range of motion. Neck supple.   Cardiovascular: Normal rate, regular rhythm and normal heart sounds.   Pulmonary/Chest: Effort normal and breath sounds normal.   Abdominal: Soft. Bowel sounds are normal.   Musculoskeletal: Normal range of motion.   Neurological: She is alert and oriented to person, place, and time.   Skin: Skin is warm and dry. Capillary refill takes 2 to 3 seconds.   Psychiatric: She has a normal mood and affect. Her behavior is normal. Judgment and thought content normal.   Nursing note and vitals reviewed.      Assessment:       1. Dizziness    2. Fainting spell    3. Fall, subsequent encounter    4. Chest pain, unspecified type    5. Nonintractable headache, unspecified chronicity pattern, unspecified headache type    6. Other general symptoms and signs     7. Weakness        Plan:           Marychuy was seen today for dizziness.    Diagnoses and all orders for this visit:    Dizziness  Fainting spell  Fall, subsequent encounter  Chest pain, unspecified type  Nonintractable headache, unspecified chronicity pattern, unspecified headache type  Other general symptoms and signs   Weakness  -     CT Head Without Contrast; Future  -     CBC auto differential; Future  -     TSH; Future  -     Basic metabolic panel; Future  -     Urinalysis  -     US  Carotid Bilateral; Future  -     IN OFFICE EKG 12-LEAD (to [facility])  -     meclizine (ANTIVERT) 25 mg tablet; Take 0.5 tablets (12.5 mg total) by mouth 2 (two) times daily as needed for Dizziness.  F/U with cardiology  Recommend report to ER immediately if symptoms persist

## 2018-09-26 ENCOUNTER — TELEPHONE (OUTPATIENT)
Dept: FAMILY MEDICINE | Facility: CLINIC | Age: 78
End: 2018-09-26

## 2018-09-26 NOTE — TELEPHONE ENCOUNTER
CT results for [facility] as follow:    1. No CT evidence of acute or neoplastic process or hydrocephalus or change since last year.   2.  Stable age-related mild to moderate deep cerebral white matter chronic small vessel ischemic changes.    Continue current plan of care. F/U with cardiology as advised.   
Called all numbers on file and one continues to go to  and the other two are disconnected. Letter sent.  
No

## 2018-10-01 ENCOUNTER — PATIENT OUTREACH (OUTPATIENT)
Dept: ADMINISTRATIVE | Facility: CLINIC | Age: 78
End: 2018-10-01

## 2018-10-01 RX ORDER — PANTOPRAZOLE SODIUM 40 MG/1
TABLET, DELAYED RELEASE ORAL
Qty: 90 TABLET | Refills: 3 | Status: SHIPPED | OUTPATIENT
Start: 2018-10-01 | End: 2018-10-02 | Stop reason: SDUPTHER

## 2018-10-01 NOTE — PROGRESS NOTES
Denisse Pittman RN attempted to contact patient. No answer. The following message was left for the patient to return the call:  Good morning,  I am a nurse calling on behalf of [facility] from the [facility].  This is a Transitional Care Call for Marychuy Bruce. When you have a moment please contact us at [phone] or [phone] Monday through Friday, between the hours of 8 am to 4 pm. We look forward to speaking with you. On behalf of [facility] have a nice day.    The patient has a scheduled HOSFU appointment with Yohannes Toribio MD on 10/15/18 @ [id number]. Message sent to Physician staff.

## 2018-10-02 ENCOUNTER — TELEPHONE (OUTPATIENT)
Dept: FAMILY MEDICINE | Facility: CLINIC | Age: 78
End: 2018-10-02

## 2018-10-02 RX ORDER — PANTOPRAZOLE SODIUM 40 MG/1
40 TABLET, DELAYED RELEASE ORAL DAILY
Qty: 90 TABLET | Refills: 3 | Status: SHIPPED | OUTPATIENT
Start: 2018-10-02

## 2018-10-02 NOTE — TELEPHONE ENCOUNTER
----- Message from Modesto Meraz sent at 10/2/2018  2:33 PM CDT -----  Contact: [facility] [phone]  She is calling in regards to the pt's side pain and burning with urination, 5-50 bacteria found in her lab work, please advise [phone] -Ms.Gina KRUGER from [employer] or call back the pt directly if there is no further instruction needed.

## 2018-10-02 NOTE — TELEPHONE ENCOUNTER
----- Message from Grayson KIMBLE Zion sent at 10/2/2018  8:10 AM CDT -----  Contact: Florecita/[facility]  Florecita called in regarding the attached patient & her Rx for pantoprazole (PROTONIX) 40 MG tablet.  Florecita stated it is not covered at 2 x daily & wanted to see if that could be changed to 1 x daily.      [facility] - [address] [address]  Phone: [phone] Fax: [phone]

## 2018-11-02 RX ORDER — DULOXETIN HYDROCHLORIDE 30 MG/1
CAPSULE, DELAYED RELEASE ORAL
Qty: 30 CAPSULE | Refills: 5 | OUTPATIENT
Start: 2018-11-02

## 2018-11-08 RX ORDER — DULOXETIN HYDROCHLORIDE 30 MG/1
CAPSULE, DELAYED RELEASE ORAL
Qty: 30 CAPSULE | Refills: 11 | OUTPATIENT
Start: 2018-11-08

## 2019-01-14 RX ORDER — MONTELUKAST SODIUM 10 MG/1
TABLET ORAL
Qty: 90 TABLET | Refills: 3 | Status: SHIPPED | OUTPATIENT
Start: 2019-01-14 | End: 2020-05-18

## 2019-01-14 RX ORDER — DULOXETIN HYDROCHLORIDE 30 MG/1
CAPSULE, DELAYED RELEASE ORAL
Qty: 90 CAPSULE | Refills: 3 | OUTPATIENT
Start: 2019-01-14

## 2019-02-25 ENCOUNTER — LAB VISIT (OUTPATIENT)
Dept: LAB | Facility: HOSPITAL | Age: 79
End: 2019-02-25
Attending: FAMILY MEDICINE
Payer: MEDICARE

## 2019-02-25 ENCOUNTER — OFFICE VISIT (OUTPATIENT)
Dept: FAMILY MEDICINE | Facility: CLINIC | Age: 79
End: 2019-02-25
Payer: MEDICARE

## 2019-02-25 VITALS
BODY MASS INDEX: 22.53 KG/M2 | SYSTOLIC BLOOD PRESSURE: 162 MMHG | DIASTOLIC BLOOD PRESSURE: 82 MMHG | HEART RATE: 67 BPM | WEIGHT: 140.19 LBS | HEIGHT: 66 IN | TEMPERATURE: 98 F

## 2019-02-25 DIAGNOSIS — F41.1 ANXIETY STATE: ICD-10-CM

## 2019-02-25 DIAGNOSIS — R79.89 LOW TSH LEVEL: ICD-10-CM

## 2019-02-25 DIAGNOSIS — I10 ESSENTIAL HYPERTENSION: ICD-10-CM

## 2019-02-25 DIAGNOSIS — F33.0 MILD EPISODE OF RECURRENT MAJOR DEPRESSIVE DISORDER: ICD-10-CM

## 2019-02-25 DIAGNOSIS — I10 ESSENTIAL HYPERTENSION: Primary | ICD-10-CM

## 2019-02-25 DIAGNOSIS — G43.909 MIGRAINE WITHOUT STATUS MIGRAINOSUS, NOT INTRACTABLE, UNSPECIFIED MIGRAINE TYPE: ICD-10-CM

## 2019-02-25 LAB — TSH SERPL DL<=0.005 MIU/L-ACNC: 0.73 UIU/ML

## 2019-02-25 PROCEDURE — 99499 UNLISTED E&M SERVICE: CPT | Mod: S$GLB,,, | Performed by: FAMILY MEDICINE

## 2019-02-25 PROCEDURE — 99499 RISK ADDL DX/OHS AUDIT: ICD-10-PCS | Mod: S$GLB,,, | Performed by: FAMILY MEDICINE

## 2019-02-25 PROCEDURE — 1100F PTFALLS ASSESS-DOCD GE2>/YR: CPT | Mod: CPTII,S$GLB,, | Performed by: FAMILY MEDICINE

## 2019-02-25 PROCEDURE — 36415 COLL VENOUS BLD VENIPUNCTURE: CPT | Mod: PO

## 2019-02-25 PROCEDURE — 3077F PR MOST RECENT SYSTOLIC BLOOD PRESSURE >= 140 MM HG: ICD-10-PCS | Mod: CPTII,S$GLB,, | Performed by: FAMILY MEDICINE

## 2019-02-25 PROCEDURE — 84443 ASSAY THYROID STIM HORMONE: CPT

## 2019-02-25 PROCEDURE — 99214 PR OFFICE/OUTPT VISIT, EST, LEVL IV, 30-39 MIN: ICD-10-PCS | Mod: S$GLB,,, | Performed by: FAMILY MEDICINE

## 2019-02-25 PROCEDURE — 3288F PR FALLS RISK ASSESSMENT DOCUMENTED: ICD-10-PCS | Mod: CPTII,S$GLB,, | Performed by: FAMILY MEDICINE

## 2019-02-25 PROCEDURE — 3079F PR MOST RECENT DIASTOLIC BLOOD PRESSURE 80-89 MM HG: ICD-10-PCS | Mod: CPTII,S$GLB,, | Performed by: FAMILY MEDICINE

## 2019-02-25 PROCEDURE — 99999 PR PBB SHADOW E&M-EST. PATIENT-LVL IV: ICD-10-PCS | Mod: PBBFAC,,, | Performed by: FAMILY MEDICINE

## 2019-02-25 PROCEDURE — 99214 OFFICE O/P EST MOD 30 MIN: CPT | Mod: S$GLB,,, | Performed by: FAMILY MEDICINE

## 2019-02-25 PROCEDURE — 1100F PR PT FALLS ASSESS DOC 2+ FALLS/FALL W/INJURY/YR: ICD-10-PCS | Mod: CPTII,S$GLB,, | Performed by: FAMILY MEDICINE

## 2019-02-25 PROCEDURE — 3079F DIAST BP 80-89 MM HG: CPT | Mod: CPTII,S$GLB,, | Performed by: FAMILY MEDICINE

## 2019-02-25 PROCEDURE — 99999 PR PBB SHADOW E&M-EST. PATIENT-LVL IV: CPT | Mod: PBBFAC,,, | Performed by: FAMILY MEDICINE

## 2019-02-25 PROCEDURE — 3077F SYST BP >= 140 MM HG: CPT | Mod: CPTII,S$GLB,, | Performed by: FAMILY MEDICINE

## 2019-02-25 PROCEDURE — 3288F FALL RISK ASSESSMENT DOCD: CPT | Mod: CPTII,S$GLB,, | Performed by: FAMILY MEDICINE

## 2019-02-25 RX ORDER — AMLODIPINE BESYLATE 5 MG/1
5 TABLET ORAL DAILY
Qty: 90 TABLET | Refills: 3 | Status: SHIPPED | OUTPATIENT
Start: 2019-02-25 | End: 2019-12-03 | Stop reason: SDUPTHER

## 2019-02-25 RX ORDER — FLUOXETINE HYDROCHLORIDE 20 MG/1
60 CAPSULE ORAL DAILY
Qty: 270 CAPSULE | Refills: 3 | Status: SHIPPED | OUTPATIENT
Start: 2019-02-25 | End: 2019-12-03 | Stop reason: SDUPTHER

## 2019-02-25 NOTE — PROGRESS NOTES
Patient presents follow-up.  Still had nervousness, anxiety, depression.  No SI/HI.  Issues with .  No abuse.  Currently on Prozac, but not helping currently.  Previously was on Cymbalta, Zoloft and Lexapro.   She does get some headaches approximately every other day which have not changed from previously.  Prior negative MRI brain 2018.   Denies significant appetite or weight changes.  Blood pressure elevated.  States compliance medication.  Recent TSH slightly decreased.      Past Medical History:  Past Medical History:   Diagnosis Date    Anxiety state 1/23/2014    CAD (coronary artery disease)     Carotid stenosis 3/25/2013    Overview:  Calcific, nonobstructive (1/31/12, 3/19/13, [facility])     Chronic back pain     Chronic bronchitis     Chronic gastritis     Chronic rhinitis     Closed displaced fracture of middle phalanx of right ring finger 1/7/2015    DDD (degenerative disc disease), cervical     Degeneration of lumbar or lumbosacral intervertebral disc 7/2/2014    Diverticulosis     Fractures 2015    right ring finger    GERD (gastroesophageal reflux disease)     History of stroke     History of stroke     ?    HTN (hypertension)     Hyperlipidemia     Inflammatory polyps of colon 3/17/15    Injury of extensor tendon of hand 1/7/2015    Lumbosacral spondylosis 7/2/2014    Migraine headache     Mild aortic stenosis     Osteoporosis     Syncope     Thyroid nodule      Past Surgical History:   Procedure Laterality Date    CARDIAC CATHETERIZATION  12/11/2014    LAD mid-vessel eccentric stenosis of around 50%    CARDIAC CATHETERIZATION  11/14/2017    Eccentric stenosis noted in the mid LAD of around 40%. IFR performed showing no evidence of hemodynamically significant stenosis.    CARDIAC CATHETERIZATION  07/26/2018    Non obstructive coronary disease    CARPAL TUNNEL RELEASE Right     CATARACT EXTRACTION Bilateral     COLONOSCOPY W/ BIOPSIES  8/26/2010    benign mucosa     ESOPHAGOGASTRODUODENOSCOPY  2/15/2012    ESOPHAGOGASTRODUODENOSCOPY  3/2015    FINE NEEDLE ASPIRATION      thyroid - colloid    FINE NEEDLE ASPIRATION  1/15/2014    Thyroid- benign    HYSTERECTOMY      Lower extremitry angiogram  06/06/2014    no significant disease     Social History     Socioeconomic History    Marital status:      Spouse name: Not on file    Number of children: Not on file    Years of education: Not on file    Highest education level: Not on file   Social Needs    Financial resource strain: Not on file    Food insecurity - worry: Not on file    Food insecurity - inability: Not on file    Transportation needs - medical: Not on file    Transportation needs - non-medical: Not on file   Occupational History    Not on file   Tobacco Use    Smoking status: Never Smoker   Substance and Sexual Activity    Alcohol use: No    Drug use: Not on file    Sexual activity: Not on file   Other Topics Concern    Not on file   Social History Narrative    Not on file     Family History   Problem Relation Age of Onset    Hypertension Mother     Throat cancer Mother     Heart attack Mother 60        MI    Hypertension Sister     Hypertension Father     Thyroid disease Sister     Stroke Sister      Review of patient's allergies indicates:   Allergen Reactions    Codeine     Sertraline      Unknown^    Sulfa (sulfonamide antibiotics)      Current Outpatient Medications on File Prior to Visit   Medication Sig Dispense Refill    albuterol (PROAIR HFA) 90 mcg/actuation inhaler Inhale 2 puffs into the lungs every 6 (six) hours as needed for Wheezing. 3 Inhaler 3    aspirin (ECOTRIN) 81 MG EC tablet Take 81 mg by mouth.      atorvastatin (LIPITOR) 80 MG tablet Take 80 mg by mouth once daily.      benzonatate (TESSALON) 100 MG capsule TAKE 1 CAPSULE BY MOUTH 3TIMES A DAY AS NEEDED FORCOUGH 30 capsule 0    besifloxacin (BESIVANCE) 0.6 % DrpS 1 drop.      difluprednate (DUREZOL) 0.05  % Drop ophthalmic solution PLACE 1 DROP INTO THE LEFT EYE 3 TIMES DAILY. START AFTER SURGERY      fluticasone-salmeterol 100-50 mcg/dose (ADVAIR DISKUS) 100-50 mcg/dose diskus inhaler INHALE 1 PUFF INTO THE LUNGS TWICE A DAY 60 each 5    metoprolol succinate (TOPROL-XL) 50 MG 24 hr tablet Take 50 mg by mouth once daily.      montelukast (SINGULAIR) 10 mg tablet TAKE 1 TABLET BY MOUTH EVERY DAY 90 tablet 3    nitroGLYCERIN (NITROSTAT) 0.4 MG SL tablet Place 0.4 mg under the tongue every 5 (five) minutes as needed for Chest pain.      pantoprazole (PROTONIX) 40 MG tablet Take 1 tablet (40 mg total) by mouth once daily. 90 tablet 3    umeclidinium (INCRUSE ELLIPTA) 62.5 mcg/actuation DsDv Inhale 1 puff into the lungs once as needed. Controller      [DISCONTINUED] amLODIPine (NORVASC) 2.5 MG tablet TAKE 1 TABLET EVERY DAY 30 tablet 11    [DISCONTINUED] FLUoxetine (PROZAC) 40 MG capsule Take 1 capsule (40 mg total) by mouth once daily. 90 capsule 1    [DISCONTINUED] NIFEdipine (PROCARDIA-XL) 60 MG (OSM) 24 hr tablet       cetirizine (ZYRTEC) 10 MG tablet Take 1 tablet (10 mg total) by mouth once daily. 10 tablet 0    meclizine (ANTIVERT) 25 mg tablet Take 0.5 tablets (12.5 mg total) by mouth 2 (two) times daily as needed for Dizziness. 5 tablet 0    [DISCONTINUED] DULoxetine (CYMBALTA) 30 MG capsule Take 1 daily for 1 week, then stop 7 capsule 0    [DISCONTINUED] PROLENSA 0.07 % Drop Apply 1 drop to eye once daily.       [DISCONTINUED] ranolazine (RANEXA) 500 MG Tb12 Take 500 mg by mouth 2 (two) times daily.      [DISCONTINUED] traZODone (DESYREL) 50 MG tablet TAKE 1 TABLET IN THE EVENING 90 tablet 3     No current facility-administered medications on file prior to visit.            ROS:  GENERAL: No fever, chills.   CARDIOVASCULAR: Denies exertional chest pain.  ABDOMEN: Appetite fine. Denies diarrhea, abdominal pain, hematemesis or blood in stool.  URINARY: No flank pain, dysuria or  "hematuria.      OBJECTIVE:     Vitals:    02/25/19 0948   BP: (!) 162/82   Pulse: 67   Temp: 97.7 °F (36.5 °C)   TempSrc: Oral   Weight: 63.6 kg (140 lb 3.2 oz)   Height: 5' 5.5" (1.664 m)     Wt Readings from Last 3 Encounters:   02/25/19 63.6 kg (140 lb 3.2 oz)   09/25/18 64.4 kg (142 lb)   09/25/18 64.4 kg (142 lb)     APPEARANCE: Well nourished, well developed, in no acute distress.    HEAD: Normocephalic.  Atraumatic.  No sinus tenderness.  EYES:   Right eye: Pupil reactive.  Conjunctiva clear.    Left eye: Pupil reactive.  Conjunctiva clear.    Both fundi:  Grossly normal to nondilated exam. EOMI.    EARS: TM's intact. Light reflex normal. No retraction or perforation.    NOSE:  clear.  MOUTH & THROAT:  No pharyngeal erythema or exudate. No lesions.  NECK: Supple. No bruits.  No JVD.  No cervical lymphadenopathy.  No thyromegaly.    CHEST: Breath sounds clear bilaterally.  Normal respiratory effort  CARDIOVASCULAR: Normal rate.  Regular rhythm.  No murmurs.  No rub.  No gallops.  ABDOMEN: Bowel sounds normal.  Soft.  No tenderness.  No organomegaly.  PERIPHERAL VASCULAR: No cyanosis.  No clubbing.  No edema.  NEUROLOGIC: No focal findings.  MENTAL STATUS: Alert.  Oriented x 3.          Marychuy was seen today for headache.    Diagnoses and all orders for this visit:    Essential hypertension  -     TSH; Future    Low TSH level  -     TSH; Future    Mild episode of recurrent major depressive disorder  -     TSH; Future    Anxiety state  -     TSH; Future    Migraine without status migrainosus, not intractable, unspecified migraine type    Other orders  -     FLUoxetine 20 MG capsule; Take 3 capsules (60 mg total) by mouth once daily.  -     amLODIPine (NORVASC) 5 MG tablet; Take 1 tablet (5 mg total) by mouth once daily.     increase Prozac and amlodipine.  Follow-up 1 month.  Previously referred to Psychiatry, but had difficulty finding someone on her insurance.  She will look into this again.   "

## 2019-03-06 ENCOUNTER — TELEPHONE (OUTPATIENT)
Dept: FAMILY MEDICINE | Facility: CLINIC | Age: 79
End: 2019-03-06

## 2019-03-06 NOTE — TELEPHONE ENCOUNTER
----- Message from Denisa Padron sent at 3/6/2019  2:10 PM CST -----  Contact: Daysi/[facility]  States he needs to get an update med list on the patient. States she's calling on some medicine she hasn't filled in a while. He doesn't want to fill something that may have been discontinued. Please call Daysi at [phone] or fax# [phone]. Thank you

## 2019-05-20 ENCOUNTER — TELEPHONE (OUTPATIENT)
Dept: FAMILY MEDICINE | Facility: CLINIC | Age: 79
End: 2019-05-20

## 2019-05-20 NOTE — TELEPHONE ENCOUNTER
Per Ann, patient recently in [place] for URI and gastroenteritis, hospitalist ordered HH and [facility] put in for PT, OT, nurse's aide and  consult, will you sign HH orders?

## 2019-05-20 NOTE — TELEPHONE ENCOUNTER
----- Message from Alesha Cast sent at 5/20/2019 11:28 AM CDT -----  Contact: Ann- [facility] health  Ann would like to speak with nurse in regards to Dr. Toribio following patient's home health services. Please call nAn back at [phone].      Thanks,   Alesha Cast

## 2019-05-23 ENCOUNTER — TELEPHONE (OUTPATIENT)
Dept: FAMILY MEDICINE | Facility: CLINIC | Age: 79
End: 2019-05-23

## 2019-05-29 ENCOUNTER — TELEPHONE (OUTPATIENT)
Dept: ADMINISTRATIVE | Facility: CLINIC | Age: 79
End: 2019-05-29

## 2019-05-29 NOTE — TELEPHONE ENCOUNTER
Home Health SOC 05/20/2019 - 07/18/2019 with [facility]) - Dr. Yohannse Toribio. PT services.

## 2019-06-04 ENCOUNTER — TELEPHONE (OUTPATIENT)
Dept: FAMILY MEDICINE | Facility: CLINIC | Age: 79
End: 2019-06-04

## 2019-06-04 DIAGNOSIS — R26.0 STAGGERING GAIT: Primary | ICD-10-CM

## 2019-06-04 DIAGNOSIS — R55 SYNCOPE AND COLLAPSE: ICD-10-CM

## 2019-06-04 DIAGNOSIS — M19.90 OSTEOARTHRITIS, UNSPECIFIED OSTEOARTHRITIS TYPE, UNSPECIFIED SITE: ICD-10-CM

## 2019-06-10 ENCOUNTER — OFFICE VISIT (OUTPATIENT)
Dept: FAMILY MEDICINE | Facility: CLINIC | Age: 79
End: 2019-06-10
Payer: MEDICARE

## 2019-06-10 VITALS
DIASTOLIC BLOOD PRESSURE: 78 MMHG | BODY MASS INDEX: 23.46 KG/M2 | HEIGHT: 66 IN | TEMPERATURE: 98 F | WEIGHT: 146 LBS | HEART RATE: 78 BPM | SYSTOLIC BLOOD PRESSURE: 133 MMHG

## 2019-06-10 DIAGNOSIS — M81.0 OSTEOPOROSIS, UNSPECIFIED OSTEOPOROSIS TYPE, UNSPECIFIED PATHOLOGICAL FRACTURE PRESENCE: ICD-10-CM

## 2019-06-10 DIAGNOSIS — J41.0 SIMPLE CHRONIC BRONCHITIS: ICD-10-CM

## 2019-06-10 DIAGNOSIS — I10 ESSENTIAL HYPERTENSION: ICD-10-CM

## 2019-06-10 DIAGNOSIS — Z87.440 HISTORY OF UTI: Primary | ICD-10-CM

## 2019-06-10 LAB
BACTERIA #/AREA URNS HPF: ABNORMAL /HPF
BILIRUB UR QL STRIP: NEGATIVE
CLARITY UR: CLEAR
COLOR UR: YELLOW
GLUCOSE UR QL STRIP: NEGATIVE
HGB UR QL STRIP: NEGATIVE
KETONES UR QL STRIP: NEGATIVE
LEUKOCYTE ESTERASE UR QL STRIP: ABNORMAL
MICROSCOPIC COMMENT: ABNORMAL
NITRITE UR QL STRIP: NEGATIVE
PH UR STRIP: 6 [PH] (ref 5–8)
PROT UR QL STRIP: ABNORMAL
RBC #/AREA URNS HPF: 2 /HPF (ref 0–4)
SP GR UR STRIP: 1.02 (ref 1–1.03)
SQUAMOUS #/AREA URNS HPF: 8 /HPF
URN SPEC COLLECT METH UR: ABNORMAL
WBC #/AREA URNS HPF: 30 /HPF (ref 0–5)

## 2019-06-10 PROCEDURE — 3078F PR MOST RECENT DIASTOLIC BLOOD PRESSURE < 80 MM HG: ICD-10-PCS | Mod: CPTII,S$GLB,, | Performed by: FAMILY MEDICINE

## 2019-06-10 PROCEDURE — 3288F PR FALLS RISK ASSESSMENT DOCUMENTED: ICD-10-PCS | Mod: CPTII,S$GLB,, | Performed by: FAMILY MEDICINE

## 2019-06-10 PROCEDURE — 3075F SYST BP GE 130 - 139MM HG: CPT | Mod: CPTII,S$GLB,, | Performed by: FAMILY MEDICINE

## 2019-06-10 PROCEDURE — 3288F FALL RISK ASSESSMENT DOCD: CPT | Mod: CPTII,S$GLB,, | Performed by: FAMILY MEDICINE

## 2019-06-10 PROCEDURE — 99214 PR OFFICE/OUTPT VISIT, EST, LEVL IV, 30-39 MIN: ICD-10-PCS | Mod: S$GLB,,, | Performed by: FAMILY MEDICINE

## 2019-06-10 PROCEDURE — 99499 RISK ADDL DX/OHS AUDIT: ICD-10-PCS | Mod: S$GLB,,, | Performed by: FAMILY MEDICINE

## 2019-06-10 PROCEDURE — 99214 OFFICE O/P EST MOD 30 MIN: CPT | Mod: S$GLB,,, | Performed by: FAMILY MEDICINE

## 2019-06-10 PROCEDURE — 99999 PR PBB SHADOW E&M-EST. PATIENT-LVL III: CPT | Mod: PBBFAC,,, | Performed by: FAMILY MEDICINE

## 2019-06-10 PROCEDURE — 3078F DIAST BP <80 MM HG: CPT | Mod: CPTII,S$GLB,, | Performed by: FAMILY MEDICINE

## 2019-06-10 PROCEDURE — 99999 PR PBB SHADOW E&M-EST. PATIENT-LVL III: ICD-10-PCS | Mod: PBBFAC,,, | Performed by: FAMILY MEDICINE

## 2019-06-10 PROCEDURE — 81000 URINALYSIS NONAUTO W/SCOPE: CPT | Mod: PO

## 2019-06-10 PROCEDURE — 1100F PR PT FALLS ASSESS DOC 2+ FALLS/FALL W/INJURY/YR: ICD-10-PCS | Mod: CPTII,S$GLB,, | Performed by: FAMILY MEDICINE

## 2019-06-10 PROCEDURE — 1100F PTFALLS ASSESS-DOCD GE2>/YR: CPT | Mod: CPTII,S$GLB,, | Performed by: FAMILY MEDICINE

## 2019-06-10 PROCEDURE — 3075F PR MOST RECENT SYSTOLIC BLOOD PRESS GE 130-139MM HG: ICD-10-PCS | Mod: CPTII,S$GLB,, | Performed by: FAMILY MEDICINE

## 2019-06-10 PROCEDURE — 99499 UNLISTED E&M SERVICE: CPT | Mod: S$GLB,,, | Performed by: FAMILY MEDICINE

## 2019-06-10 RX ORDER — CEPHALEXIN 500 MG/1
CAPSULE ORAL
COMMUNITY
Start: 2019-05-16 | End: 2019-06-10 | Stop reason: ALTCHOICE

## 2019-06-10 RX ORDER — AMOXICILLIN AND CLAVULANATE POTASSIUM 875; 125 MG/1; MG/1
TABLET, FILM COATED ORAL
COMMUNITY
Start: 2019-05-20 | End: 2019-06-10 | Stop reason: ALTCHOICE

## 2019-06-10 NOTE — PROGRESS NOTES
Follow-up hospitalization for kidney infection as below.  Symptoms have resolved.  No fever chills.  No abdominal pain.  Osteoporosis due for follow-up imaging.  Hypertension controlled.  Chronic bronchitis stable with current inhalers.    Rashad Santoro MD - 05/18/2019 11:15 AM CDT  Formatting of this note might be different from the original.  [facility] DISCHARGE SUMMARY  Patient ID:  Marychuy Bruce  [id number]  79 y.o.  1940    Admit Date:   5/16/2019 12:57 PM    Discharge Date:   Discharge Today: 5/18/2019    Admitting Physician:   No admitting provider for patient encounter.     Discharge Physician:   RASHAD SANTORO MD    Consults:  Consultants   Provider Service Role Specialty   Gume, Maura, NP -- Nurse Practitioner Nurse Practitioner Acute Care       Reason for Admission/Admission Diagnoses:   Present on Admission:   (Resolved) Acute gastroenteritis   (Resolved) Acute abdominal pain   Acute cystitis without hematuria   Weakness generalized   Hyperglycemia   Essential hypertension    Discharge Diagnoses:   Active Hospital Problems   Diagnosis Date Noted    Acute cystitis without hematuria 05/16/2019    Hyperglycemia 05/16/2019    Weakness generalized 01/23/2014    Essential hypertension 10/24/2012     Resolved Hospital Problems   Diagnosis Date Noted Date Resolved    Acute gastroenteritis 05/16/2019 05/18/2019    Acute abdominal pain 05/16/2019 05/18/2019     History of Present Illness:   HPI from H&P by Maura Dunaway NP  Mrs. Bruce is a 79-year-old -American female with a past medical history significant for CVA, hypertension, GERD, hiatal hernia, CAD who presents to the ED with right upper quadrant abdominal pain associated with intractable nausea and vomiting. The pain started this morning upon waking. It was a sudden sharp intermittent pain that has been unrelieved with rest and repositioning. She tried to take a Phenergan by mouth; however, vomited shortly after. The pain  has improved with IV pain medication in the ED. Denies radiation. The patient reports eating fried chicken and french fries last night prior to going to bed. She has not eaten anything abnormal. Denies being around anyone with similar symptoms. She went to the [facility] yesterday. She does report chills, fatigue, weakness, diarrhea, and unsteady gait. Denies chest pain, shortness breath, palpitations, lower leg edema, blood in stool. She reports a frequency but denies dysuria, flank pain, or hematuria. Her nausea was improved with IV Zofran per EMS.      In the ED, vital signs are stable. Afebrile. WBC 5.2. CBC unremarkable. Glucose 147. BUN 12. Creatinine 1.07. UA positive for nitrates and bacteria. Urine culture was sent. Lipase 31. Gallbladder ultrasound without acute abnormality or evidence of stones. Negative Mock sign. They were going to discharge the patient home. However, when she went to leave she became weak and was unable to ambulate. I have personally reviewed the patient's labs, radiology test, PMH, and old records. The patient will be admitted to the Hospital Medicine team for further work-up.    Hospital Course and Treatment:   Admission Information   Date & Time  5/16/2019 Department  [facility] Medicine Dept. Phone  [phone]       Acute abdominal pain  Discharged, tolerated regular diet  Suspect secondary to UTI  UTI  Continue Rocephin during hospital course  Urine culture ecoli and pan sensitive  Discharged on augmentin with close follow up with PCP     Generalized weakness  Continue PT/OT  Recommendation is for home health with PT/OT      Hyperlipidemia  Continue lipitor      Hx of CVA  Continue ASA and statin.     GERD  Continue protonix.     DVT ppx   Lovenox    I discussed with the patient disease process and treatment.     I have personally seen and examined the patient, Marychuy Bruce, in a face to face encounter on the date of discharge.     She is cleared for  discharge with instructions to follow up as directed.   Total time in the care and discharge planning of this patient was greater than 30 minutes.    Past Medical History:  Past Medical History:   Diagnosis Date    Anxiety state 1/23/2014    CAD (coronary artery disease)     Carotid stenosis 3/25/2013    Overview:  Calcific, nonobstructive (1/31/12, 3/19/13, [facility])     Chronic back pain     Chronic bronchitis     Chronic gastritis     Chronic rhinitis     Closed displaced fracture of middle phalanx of right ring finger 1/7/2015    DDD (degenerative disc disease), cervical     Degeneration of lumbar or lumbosacral intervertebral disc 7/2/2014    Diverticulosis     Fractures 2015    right ring finger    GERD (gastroesophageal reflux disease)     History of stroke     History of stroke     ?    HTN (hypertension)     Hyperlipidemia     Inflammatory polyps of colon 3/17/15    Injury of extensor tendon of hand 1/7/2015    Lumbosacral spondylosis 7/2/2014    Migraine headache     Mild aortic stenosis     Osteoporosis     Syncope     Thyroid nodule      Past Surgical History:   Procedure Laterality Date    CARDIAC CATHETERIZATION  12/11/2014    LAD mid-vessel eccentric stenosis of around 50%    CARDIAC CATHETERIZATION  11/14/2017    Eccentric stenosis noted in the mid LAD of around 40%. IFR performed showing no evidence of hemodynamically significant stenosis.    CARDIAC CATHETERIZATION  07/26/2018    Non obstructive coronary disease    CARPAL TUNNEL RELEASE Right     CATARACT EXTRACTION Bilateral     COLONOSCOPY W/ BIOPSIES  8/26/2010    benign mucosa    ESOPHAGOGASTRODUODENOSCOPY  2/15/2012    ESOPHAGOGASTRODUODENOSCOPY  3/2015    FINE NEEDLE ASPIRATION      thyroid - colloid    FINE NEEDLE ASPIRATION  1/15/2014    Thyroid- benign    HYSTERECTOMY      Lower extremitry angiogram  06/06/2014    no significant disease     Social History     Socioeconomic History    Marital status:       Spouse name: Not on file    Number of children: Not on file    Years of education: Not on file    Highest education level: Not on file   Occupational History    Not on file   Social Needs    Financial resource strain: Not on file    Food insecurity:     Worry: Not on file     Inability: Not on file    Transportation needs:     Medical: Not on file     Non-medical: Not on file   Tobacco Use    Smoking status: Never Smoker   Substance and Sexual Activity    Alcohol use: No    Drug use: Not on file    Sexual activity: Not on file   Lifestyle    Physical activity:     Days per week: Not on file     Minutes per session: Not on file    Stress: Not on file   Relationships    Social connections:     Talks on phone: Not on file     Gets together: Not on file     Attends Judaism service: Not on file     Active member of club or organization: Not on file     Attends meetings of clubs or organizations: Not on file     Relationship status: Not on file   Other Topics Concern    Not on file   Social History Narrative    Not on file     Family History   Problem Relation Age of Onset    Hypertension Mother     Throat cancer Mother     Heart attack Mother 60        MI    Hypertension Sister     Hypertension Father     Thyroid disease Sister     Stroke Sister      Review of patient's allergies indicates:   Allergen Reactions    Codeine Nausea And Vomiting    Sertraline Nausea And Vomiting     Unknown^    Sulfa (sulfonamide antibiotics) Rash     Current Outpatient Medications on File Prior to Visit   Medication Sig Dispense Refill    albuterol (PROAIR HFA) 90 mcg/actuation inhaler Inhale 2 puffs into the lungs every 6 (six) hours as needed for Wheezing. 3 Inhaler 3    amLODIPine (NORVASC) 5 MG tablet Take 1 tablet (5 mg total) by mouth once daily. 90 tablet 3    aspirin (ECOTRIN) 81 MG EC tablet Take 81 mg by mouth.      atorvastatin (LIPITOR) 80 MG tablet Take 80 mg by mouth once daily.    "   benzonatate (TESSALON) 100 MG capsule TAKE 1 CAPSULE BY MOUTH 3TIMES A DAY AS NEEDED FORCOUGH 30 capsule 0    besifloxacin (BESIVANCE) 0.6 % DrpS 1 drop.      difluprednate (DUREZOL) 0.05 % Drop ophthalmic solution PLACE 1 DROP INTO THE LEFT EYE 3 TIMES DAILY. START AFTER SURGERY      FLUoxetine 20 MG capsule Take 3 capsules (60 mg total) by mouth once daily. 270 capsule 3    fluticasone-salmeterol 100-50 mcg/dose (ADVAIR DISKUS) 100-50 mcg/dose diskus inhaler INHALE 1 PUFF INTO THE LUNGS TWICE A DAY 60 each 5    metoprolol succinate (TOPROL-XL) 50 MG 24 hr tablet Take 50 mg by mouth once daily.      montelukast (SINGULAIR) 10 mg tablet TAKE 1 TABLET BY MOUTH EVERY DAY 90 tablet 3    nitroGLYCERIN (NITROSTAT) 0.4 MG SL tablet Place 0.4 mg under the tongue every 5 (five) minutes as needed for Chest pain.      pantoprazole (PROTONIX) 40 MG tablet Take 1 tablet (40 mg total) by mouth once daily. 90 tablet 3    cetirizine (ZYRTEC) 10 MG tablet Take 1 tablet (10 mg total) by mouth once daily. 10 tablet 0    meclizine (ANTIVERT) 25 mg tablet Take 0.5 tablets (12.5 mg total) by mouth 2 (two) times daily as needed for Dizziness. 5 tablet 0    [DISCONTINUED] amoxicillin-clavulanate 875-125mg (AUGMENTIN) 875-125 mg per tablet       [DISCONTINUED] cephALEXin (KEFLEX) 500 MG capsule       [DISCONTINUED] umeclidinium (INCRUSE ELLIPTA) 62.5 mcg/actuation DsDv Inhale 1 puff into the lungs once as needed. Controller       No current facility-administered medications on file prior to visit.            ROS:  GENERAL: No fever, chills,  or significant weight changes.   CARDIOVASCULAR: Denies chest pain, PND, orthopnea or reduced exercise tolerance.  ABDOMEN: Appetite fine. Denies diarrhea, abdominal pain, hematemesis or blood in stool.  URINARY: No flank pain, dysuria or hematuria.      OBJECTIVE:     Vitals:    06/10/19 0946   BP: 133/78   Pulse: 78   Temp: 98 °F (36.7 °C)   Weight: 66.2 kg (146 lb)   Height: 5' 5.5" " (1.664 m)     Wt Readings from Last 3 Encounters:   06/10/19 66.2 kg (146 lb)   02/25/19 63.6 kg (140 lb 3.2 oz)   09/25/18 64.4 kg (142 lb)     APPEARANCE: Well nourished, well developed, in no acute distress.    HEAD: Normocephalic.  Atraumatic.  No sinus tenderness.  EYES:   Right eye: Pupil reactive.  Conjunctiva clear.    Left eye: Pupil reactive.  Conjunctiva clear.    Both fundi:  Grossly normal to nondilated exam. EOMI.    EARS: TM's intact. Light reflex normal. No retraction or perforation.    NOSE:  clear.  MOUTH & THROAT:  No pharyngeal erythema or exudate. No lesions.  NECK: Supple. No bruits.  No JVD.  No cervical lymphadenopathy.  No thyromegaly.    CHEST: Breath sounds clear bilaterally.  Normal respiratory effort  CARDIOVASCULAR: Normal rate.  Regular rhythm.  No murmurs.  No rub.  No gallops.  ABDOMEN: Bowel sounds normal.  Soft.  No tenderness.  No organomegaly.  PERIPHERAL VASCULAR: No cyanosis.  No clubbing.  No edema.  NEUROLOGIC: No focal findings.  MENTAL STATUS: Alert.  Oriented x 3.    Marychuy was seen today for hospital follow up.    Diagnoses and all orders for this visit:    History of UTI  -     Urinalysis    Osteoporosis, unspecified osteoporosis type, unspecified pathological fracture presence  -     DXA Bone Density Spine And Hip; Future    Essential hypertension    Simple chronic bronchitis    Other orders  -     Urinalysis Microscopic      Continue current medication.  Reviewed most recent laboratory.  Follow-up if recurrent symptoms.

## 2019-10-31 ENCOUNTER — TELEPHONE (OUTPATIENT)
Dept: FAMILY MEDICINE | Facility: CLINIC | Age: 79
End: 2019-10-31

## 2019-10-31 DIAGNOSIS — Z12.31 VISIT FOR SCREENING MAMMOGRAM: Primary | ICD-10-CM

## 2019-10-31 NOTE — TELEPHONE ENCOUNTER
----- Message from Kathynegro Burger sent at 10/31/2019 12:05 PM CDT -----  Contact: pt   Type:  Mammogram    Caller is requesting to schedule their annual mammogram appointment.  Order is not listed in EPIC.  Please enter order and contact patient to schedule.  Name of Caller: pt   Where would they like the mammogram performed? Any  Would the patient rather a call back or a response via [facility]?  Call back  Best Call Back Number: [phone]  Additional Information:

## 2019-11-11 ENCOUNTER — HOSPITAL ENCOUNTER (OUTPATIENT)
Dept: RADIOLOGY | Facility: HOSPITAL | Age: 79
Discharge: HOME | End: 2019-11-11
Attending: FAMILY MEDICINE
Payer: MEDICARE

## 2019-11-11 VITALS — HEIGHT: 66 IN | BODY MASS INDEX: 23.46 KG/M2 | WEIGHT: 145.94 LBS

## 2019-11-11 DIAGNOSIS — Z12.31 VISIT FOR SCREENING MAMMOGRAM: ICD-10-CM

## 2019-11-11 PROCEDURE — 77063 MAMMO DIGITAL SCREENING BILAT WITH TOMOSYNTHESIS_CAD: ICD-10-PCS | Mod: 26,,, | Performed by: RADIOLOGY

## 2019-11-11 PROCEDURE — 77067 MAMMO DIGITAL SCREENING BILAT WITH TOMOSYNTHESIS_CAD: ICD-10-PCS | Mod: 26,,, | Performed by: RADIOLOGY

## 2019-11-11 PROCEDURE — 77067 SCR MAMMO BI INCL CAD: CPT | Mod: 26,,, | Performed by: RADIOLOGY

## 2019-11-11 PROCEDURE — 77067 SCR MAMMO BI INCL CAD: CPT | Mod: TC,PO

## 2019-11-11 PROCEDURE — 77063 BREAST TOMOSYNTHESIS BI: CPT | Mod: 26,,, | Performed by: RADIOLOGY

## 2019-12-03 RX ORDER — AMLODIPINE BESYLATE 5 MG/1
TABLET ORAL
Qty: 90 TABLET | Refills: 1 | Status: SHIPPED | OUTPATIENT
Start: 2019-12-03 | End: 2020-01-23

## 2019-12-03 RX ORDER — FLUOXETINE HYDROCHLORIDE 20 MG/1
CAPSULE ORAL
Qty: 270 CAPSULE | Refills: 1 | Status: SHIPPED | OUTPATIENT
Start: 2019-12-03 | End: 2020-06-29

## 2020-01-23 ENCOUNTER — OFFICE VISIT (OUTPATIENT)
Dept: FAMILY MEDICINE | Facility: CLINIC | Age: 80
End: 2020-01-23
Payer: MEDICARE

## 2020-01-23 ENCOUNTER — LAB VISIT (OUTPATIENT)
Dept: LAB | Facility: HOSPITAL | Age: 80
End: 2020-01-23
Attending: FAMILY MEDICINE
Payer: MEDICARE

## 2020-01-23 VITALS
BODY MASS INDEX: 25.46 KG/M2 | SYSTOLIC BLOOD PRESSURE: 132 MMHG | TEMPERATURE: 98 F | WEIGHT: 152.81 LBS | OXYGEN SATURATION: 98 % | HEIGHT: 65 IN | HEART RATE: 71 BPM | DIASTOLIC BLOOD PRESSURE: 84 MMHG

## 2020-01-23 DIAGNOSIS — F41.1 ANXIETY STATE: ICD-10-CM

## 2020-01-23 DIAGNOSIS — G89.29 CHRONIC RIGHT-SIDED LOW BACK PAIN WITH RIGHT-SIDED SCIATICA: ICD-10-CM

## 2020-01-23 DIAGNOSIS — J41.0 SIMPLE CHRONIC BRONCHITIS: ICD-10-CM

## 2020-01-23 DIAGNOSIS — I35.0 MILD AORTIC STENOSIS: ICD-10-CM

## 2020-01-23 DIAGNOSIS — F33.0 MILD EPISODE OF RECURRENT MAJOR DEPRESSIVE DISORDER: ICD-10-CM

## 2020-01-23 DIAGNOSIS — R42 LIGHTHEADEDNESS: ICD-10-CM

## 2020-01-23 DIAGNOSIS — I25.10 NON-OCCLUSIVE CORONARY ARTERY DISEASE: ICD-10-CM

## 2020-01-23 DIAGNOSIS — R42 DIZZINESS: Primary | ICD-10-CM

## 2020-01-23 DIAGNOSIS — M54.41 CHRONIC RIGHT-SIDED LOW BACK PAIN WITH RIGHT-SIDED SCIATICA: ICD-10-CM
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
## 2020-01-23 DIAGNOSIS — R00.2 PALPITATIONS: ICD-10-CM

## 2020-01-23 LAB
ALBUMIN SERPL BCP-MCNC: 3.6 G/DL (ref 3.5–5.2)
ALP SERPL-CCNC: 78 U/L (ref 55–135)
ALT SERPL W/O P-5'-P-CCNC: 33 U/L (ref 10–44)
ANION GAP SERPL CALC-SCNC: 9 MMOL/L (ref 8–16)
AST SERPL-CCNC: 25 U/L (ref 10–40)
BASOPHILS # BLD AUTO: 0.02 K/UL (ref 0–0.2)
BASOPHILS NFR BLD: 0.4 % (ref 0–1.9)
BILIRUB SERPL-MCNC: 0.5 MG/DL (ref 0.1–1)
BUN SERPL-MCNC: 18 MG/DL (ref 8–23)
CALCIUM SERPL-MCNC: 9.4 MG/DL (ref 8.7–10.5)
CHLORIDE SERPL-SCNC: 106 MMOL/L (ref 95–110)
CHOLEST SERPL-MCNC: 163 MG/DL (ref 120–199)
CHOLEST/HDLC SERPL: 2.4 {RATIO} (ref 2–5)
CO2 SERPL-SCNC: 27 MMOL/L (ref 23–29)
CREAT SERPL-MCNC: 1.1 MG/DL (ref 0.5–1.4)
DIFFERENTIAL METHOD: ABNORMAL
EOSINOPHIL # BLD AUTO: 0 K/UL (ref 0–0.5)
EOSINOPHIL NFR BLD: 0.6 % (ref 0–8)
ERYTHROCYTE [DISTWIDTH] IN BLOOD BY AUTOMATED COUNT: 13.7 % (ref 11.5–14.5)
EST. GFR  (AFRICAN AMERICAN): 54.4 ML/MIN/1.73 M^2
EST. GFR  (NON AFRICAN AMERICAN): 47.2 ML/MIN/1.73 M^2
GLUCOSE SERPL-MCNC: 102 MG/DL (ref 70–110)
HCT VFR BLD AUTO: 46.8 % (ref 37–48.5)
HDLC SERPL-MCNC: 67 MG/DL (ref 40–75)
HDLC SERPL: 41.1 % (ref 20–50)
HGB BLD-MCNC: 14 G/DL (ref 12–16)
IMM GRANULOCYTES # BLD AUTO: 0.02 K/UL (ref 0–0.04)
IMM GRANULOCYTES NFR BLD AUTO: 0.4 % (ref 0–0.5)
LDLC SERPL CALC-MCNC: 79.2 MG/DL (ref 63–159)
LYMPHOCYTES # BLD AUTO: 1.5 K/UL (ref 1–4.8)
LYMPHOCYTES NFR BLD: 32.3 % (ref 18–48)
MCH RBC QN AUTO: 27.7 PG (ref 27–31)
MCHC RBC AUTO-ENTMCNC: 29.9 G/DL (ref 32–36)
MCV RBC AUTO: 93 FL (ref 82–98)
MONOCYTES # BLD AUTO: 0.4 K/UL (ref 0.3–1)
MONOCYTES NFR BLD: 7.5 % (ref 4–15)
NEUTROPHILS # BLD AUTO: 2.7 K/UL (ref 1.8–7.7)
NEUTROPHILS NFR BLD: 58.8 % (ref 38–73)
NONHDLC SERPL-MCNC: 96 MG/DL
NRBC BLD-RTO: 0 /100 WBC
PLATELET # BLD AUTO: 250 K/UL (ref 150–350)
PMV BLD AUTO: 11 FL (ref 9.2–12.9)
POTASSIUM SERPL-SCNC: 3.7 MMOL/L (ref 3.5–5.1)
PROT SERPL-MCNC: 7.6 G/DL (ref 6–8.4)
RBC # BLD AUTO: 5.06 M/UL (ref 4–5.4)
SODIUM SERPL-SCNC: 142 MMOL/L (ref 136–145)
TRIGL SERPL-MCNC: 84 MG/DL (ref 30–150)
WBC # BLD AUTO: 4.67 K/UL (ref 3.9–12.7)

## 2020-01-23 PROCEDURE — 85025 COMPLETE CBC W/AUTO DIFF WBC: CPT | Mod: HCNC

## 2020-01-23 PROCEDURE — 1100F PTFALLS ASSESS-DOCD GE2>/YR: CPT | Mod: HCNC,CPTII,S$GLB, | Performed by: FAMILY MEDICINE

## 2020-01-23 PROCEDURE — 1125F AMNT PAIN NOTED PAIN PRSNT: CPT | Mod: HCNC,S$GLB,, | Performed by: FAMILY MEDICINE

## 2020-01-23 PROCEDURE — 99499 UNLISTED E&M SERVICE: CPT | Mod: S$GLB,,, | Performed by: FAMILY MEDICINE

## 2020-01-23 PROCEDURE — 3075F PR MOST RECENT SYSTOLIC BLOOD PRESS GE 130-139MM HG: ICD-10-PCS | Mod: HCNC,CPTII,S$GLB, | Performed by: FAMILY MEDICINE

## 2020-01-23 PROCEDURE — 80061 LIPID PANEL: CPT | Mod: HCNC

## 2020-01-23 PROCEDURE — 99214 PR OFFICE/OUTPT VISIT, EST, LEVL IV, 30-39 MIN: ICD-10-PCS | Mod: HCNC,25,S$GLB, | Performed by: FAMILY MEDICINE

## 2020-01-23 PROCEDURE — G0008 ADMIN INFLUENZA VIRUS VAC: HCPCS | Mod: HCNC,S$GLB,, | Performed by: FAMILY MEDICINE

## 2020-01-23 PROCEDURE — 3079F DIAST BP 80-89 MM HG: CPT | Mod: HCNC,CPTII,S$GLB, | Performed by: FAMILY MEDICINE

## 2020-01-23 PROCEDURE — 1159F MED LIST DOCD IN RCRD: CPT | Mod: HCNC,S$GLB,, | Performed by: FAMILY MEDICINE

## 2020-01-23 PROCEDURE — 99999 PR PBB SHADOW E&M-EST. PATIENT-LVL V: ICD-10-PCS | Mod: PBBFAC,HCNC,, | Performed by: FAMILY MEDICINE

## 2020-01-23 PROCEDURE — 99214 OFFICE O/P EST MOD 30 MIN: CPT | Mod: HCNC,25,S$GLB, | Performed by: FAMILY MEDICINE

## 2020-01-23 PROCEDURE — 1159F PR MEDICATION LIST DOCUMENTED IN MEDICAL RECORD: ICD-10-PCS | Mod: HCNC,S$GLB,, | Performed by: FAMILY MEDICINE

## 2020-01-23 PROCEDURE — 3288F PR FALLS RISK ASSESSMENT DOCUMENTED: ICD-10-PCS | Mod: HCNC,CPTII,S$GLB, | Performed by: FAMILY MEDICINE

## 2020-01-23 PROCEDURE — 3075F SYST BP GE 130 - 139MM HG: CPT | Mod: HCNC,CPTII,S$GLB, | Performed by: FAMILY MEDICINE

## 2020-01-23 PROCEDURE — G0008 FLU VACCINE - HIGH DOSE (65+) PRESERVATIVE FREE IM: ICD-10-PCS | Mod: HCNC,S$GLB,, | Performed by: FAMILY MEDICINE

## 2020-01-23 PROCEDURE — 80053 COMPREHEN METABOLIC PANEL: CPT | Mod: HCNC

## 2020-01-23 PROCEDURE — 99499 RISK ADDL DX/OHS AUDIT: ICD-10-PCS | Mod: S$GLB,,, | Performed by: FAMILY MEDICINE

## 2020-01-23 PROCEDURE — 99999 PR PBB SHADOW E&M-EST. PATIENT-LVL V: CPT | Mod: PBBFAC,HCNC,, | Performed by: FAMILY MEDICINE

## 2020-01-23 PROCEDURE — 1125F PR PAIN SEVERITY QUANTIFIED, PAIN PRESENT: ICD-10-PCS | Mod: HCNC,S$GLB,, | Performed by: FAMILY MEDICINE

## 2020-01-23 PROCEDURE — 1100F PR PT FALLS ASSESS DOC 2+ FALLS/FALL W/INJURY/YR: ICD-10-PCS | Mod: HCNC,CPTII,S$GLB, | Performed by: FAMILY MEDICINE

## 2020-01-23 PROCEDURE — 3288F FALL RISK ASSESSMENT DOCD: CPT | Mod: HCNC,CPTII,S$GLB, | Performed by: FAMILY MEDICINE

## 2020-01-23 PROCEDURE — 36415 COLL VENOUS BLD VENIPUNCTURE: CPT | Mod: HCNC,PO

## 2020-01-23 PROCEDURE — 90662 FLU VACCINE - HIGH DOSE (65+) PRESERVATIVE FREE IM: ICD-10-PCS | Mod: HCNC,S$GLB,, | Performed by: FAMILY MEDICINE

## 2020-01-23 PROCEDURE — 3079F PR MOST RECENT DIASTOLIC BLOOD PRESSURE 80-89 MM HG: ICD-10-PCS | Mod: HCNC,CPTII,S$GLB, | Performed by: FAMILY MEDICINE

## 2020-01-23 PROCEDURE — 90662 IIV NO PRSV INCREASED AG IM: CPT | Mod: HCNC,S$GLB,, | Performed by: FAMILY MEDICINE

## 2020-01-23 RX ORDER — AMLODIPINE BESYLATE 2.5 MG/1
2.5 TABLET ORAL DAILY
Qty: 90 TABLET | Refills: 0 | Status: SHIPPED | OUTPATIENT
Start: 2020-01-23 | End: 2020-03-18

## 2020-01-23 NOTE — PROGRESS NOTES
Patient presents follow-up.  She feels like she gets dizziness intermittently.  This is chronic.  Ill-defined, but apparently does get some lightheadedness.  Occasional palpitation.  She has had previous cardiac evaluation and is due for follow-up again.  Nonobstructive coronary disease. Equivocal mild valvular disease previous echocardiogram.  Chest feels heavy, but no exertional chest pain. She has had some falls, most recently last November when she felt like 1 of her legs gave out..  She has had syncope in the past.  Apparent prior Holter monitoring with some rate changes per patient but she is not clear on details.  She does have issues with anxiety depression partial control current medications.  She also gets chronic discomfort in her back with radiation to the right leg.  She gets some knee pain occasionally.  Previous evaluation through pain management for degenerative disease.    Marychuy was seen today for headache, fall, dizziness and anxiety.    Diagnoses and all orders for this visit:    Dizziness  -     Ambulatory Referral to Physical/Occupational Therapy  -     Ambulatory referral to Cardiology    Lightheadedness  -     Ambulatory referral to Cardiology  -     CBC auto differential; Future  -     Comprehensive metabolic panel; Future  -     Lipid panel; Future    Chronic right-sided low back pain with right-sided sciatica  -     Ambulatory Referral to Physical/Occupational Therapy    Anxiety state    Mild episode of recurrent major depressive disorder    Fall, initial encounter  -     Ambulatory Referral to Physical/Occupational Therapy  -     Ambulatory referral to Cardiology    Palpitations  -     Ambulatory referral to Cardiology    Mild aortic stenosis    Non-occlusive coronary artery disease  -     CBC auto differential; Future  -     Comprehensive metabolic panel; Future  -     Lipid panel; Future    Simple chronic bronchitis    Other orders  -     amLODIPine (NORVASC) 2.5 MG tablet; Take 1  tablet (2.5 mg total) by mouth once daily.  -     Influenza - High Dose (65+) (PF) (IM)     Possible mild orthostasis.  Probable multifactorial symptoms.  Schedule follow-up with her cardiologist.  Arrange physical therapy.  Decrease amlodipine 2.5 mg daily.  Laboratory evaluation as above.  ER precautions.  Continue other medication as currently.            Past Medical History:  Past Medical History:   Diagnosis Date    Anxiety state 1/23/2014    CAD (coronary artery disease)     Carotid stenosis 3/25/2013    Overview:  Calcific, nonobstructive (1/31/12, 3/19/13, [facility])     Chronic back pain     Chronic bronchitis     Chronic gastritis     Chronic rhinitis     Closed displaced fracture of middle phalanx of right ring finger 1/7/2015    DDD (degenerative disc disease), cervical     Degeneration of lumbar or lumbosacral intervertebral disc 7/2/2014    Diverticulosis     Fractures 2015    right ring finger    GERD (gastroesophageal reflux disease)     History of stroke     History of stroke     ?    HTN (hypertension)     Hyperlipidemia     Inflammatory polyps of colon 3/17/15    Injury of extensor tendon of hand 1/7/2015    Lumbosacral spondylosis 7/2/2014    Migraine headache     Mild aortic stenosis     Osteoporosis     Syncope     Thyroid nodule      Past Surgical History:   Procedure Laterality Date    CARDIAC CATHETERIZATION  12/11/2014    LAD mid-vessel eccentric stenosis of around 50%    CARDIAC CATHETERIZATION  11/14/2017    Eccentric stenosis noted in the mid LAD of around 40%. IFR performed showing no evidence of hemodynamically significant stenosis.    CARDIAC CATHETERIZATION  07/26/2018    Non obstructive coronary disease    CARPAL TUNNEL RELEASE Right     CATARACT EXTRACTION Bilateral     COLONOSCOPY W/ BIOPSIES  8/26/2010    benign mucosa    ESOPHAGOGASTRODUODENOSCOPY  2/15/2012    ESOPHAGOGASTRODUODENOSCOPY  3/2015    FINE NEEDLE ASPIRATION      thyroid - colloid     FINE NEEDLE ASPIRATION  1/15/2014    Thyroid- benign    HYSTERECTOMY      Lower extremitry angiogram  06/06/2014    no significant disease    OOPHORECTOMY       Review of patient's allergies indicates:   Allergen Reactions    Codeine Nausea And Vomiting    Sertraline Nausea And Vomiting     Unknown^    Sulfa (sulfonamide antibiotics) Rash     Current Outpatient Medications on File Prior to Visit   Medication Sig Dispense Refill    albuterol (PROAIR HFA) 90 mcg/actuation inhaler Inhale 2 puffs into the lungs every 6 (six) hours as needed for Wheezing. 3 Inhaler 3    aspirin (ECOTRIN) 81 MG EC tablet Take 81 mg by mouth.      atorvastatin (LIPITOR) 80 MG tablet Take 80 mg by mouth once daily.      besifloxacin (BESIVANCE) 0.6 % DrpS 1 drop.      difluprednate (DUREZOL) 0.05 % Drop ophthalmic solution PLACE 1 DROP INTO THE LEFT EYE 3 TIMES DAILY. START AFTER SURGERY      FLUoxetine 20 MG capsule TAKE 3 CAPSULES BY MOUTH ONCE DAILY. 270 capsule 1    fluticasone-salmeterol 100-50 mcg/dose (ADVAIR DISKUS) 100-50 mcg/dose diskus inhaler INHALE 1 PUFF INTO THE LUNGS TWICE A DAY 60 each 5    metoprolol succinate (TOPROL-XL) 50 MG 24 hr tablet Take 50 mg by mouth once daily.      montelukast (SINGULAIR) 10 mg tablet TAKE 1 TABLET BY MOUTH EVERY DAY 90 tablet 3    nitroGLYCERIN (NITROSTAT) 0.4 MG SL tablet Place 0.4 mg under the tongue every 5 (five) minutes as needed for Chest pain.      [DISCONTINUED] amLODIPine (NORVASC) 5 MG tablet TAKE 1 TABLET EVERY DAY 90 tablet 1    [DISCONTINUED] benzonatate (TESSALON) 100 MG capsule TAKE 1 CAPSULE BY MOUTH 3TIMES A DAY AS NEEDED FORCOUGH 30 capsule 0    meclizine (ANTIVERT) 25 mg tablet Take 0.5 tablets (12.5 mg total) by mouth 2 (two) times daily as needed for Dizziness. 5 tablet 0    pantoprazole (PROTONIX) 40 MG tablet Take 1 tablet (40 mg total) by mouth once daily. 90 tablet 3    [DISCONTINUED] cetirizine (ZYRTEC) 10 MG tablet Take 1 tablet (10 mg total) by  "mouth once daily. 10 tablet 0     No current facility-administered medications on file prior to visit.      Social History     Socioeconomic History    Marital status:      Spouse name: Not on file    Number of children: Not on file    Years of education: Not on file    Highest education level: Not on file   Occupational History    Not on file   Social Needs    Financial resource strain: Not on file    Food insecurity:     Worry: Not on file     Inability: Not on file    Transportation needs:     Medical: Not on file     Non-medical: Not on file   Tobacco Use    Smoking status: Never Smoker   Substance and Sexual Activity    Alcohol use: No    Drug use: Not on file    Sexual activity: Not on file   Lifestyle    Physical activity:     Days per week: Not on file     Minutes per session: Not on file    Stress: Not on file   Relationships    Social connections:     Talks on phone: Not on file     Gets together: Not on file     Attends Buddhism service: Not on file     Active member of club or organization: Not on file     Attends meetings of clubs or organizations: Not on file     Relationship status: Not on file   Other Topics Concern    Not on file   Social History Narrative    Not on file     Family History   Problem Relation Age of Onset    Hypertension Mother     Throat cancer Mother     Heart attack Mother 60        MI    Hypertension Sister     Hypertension Father     Thyroid disease Sister     Stroke Sister            ROS:  GENERAL: No fever, chills.  Some weight gain   CARDIOVASCULAR: Denies exertional chest pain, PND, orthopnea.  ABDOMEN: Appetite fine. Denies diarrhea, abdominal pain, hematemesis or blood in stool.  URINARY: No flank pain, dysuria or hematuria.    Vitals:    01/23/20 1010 01/23/20 1029 01/23/20 1030   BP: 139/79 (!) 140/88 132/84   Pulse: 65  71   Temp: 98.2 °F (36.8 °C)     SpO2:   98%   Weight: 69.3 kg (152 lb 12.8 oz)     Height: 5' 5" (1.651 m)       Wt " Readings from Last 3 Encounters:   01/23/20 69.3 kg (152 lb 12.8 oz)   11/11/19 66.2 kg (145 lb 15.1 oz)   06/10/19 66.2 kg (146 lb)       OBJECTIVE:   APPEARANCE: Well nourished, well developed, in no acute distress.    HEAD: Normocephalic.  Atraumatic.  No sinus tenderness.  EYES:   Right eye: Pupil reactive.  Conjunctiva clear.    Left eye: Pupil reactive.  Conjunctiva clear.  EOMI.    EARS:  Right cerumen obscured.  Left TM intact. Light reflex normal. No retraction or perforation.    NOSE:  clear.  MOUTH & THROAT:  No pharyngeal erythema or exudate. No lesions.  NECK: Supple. No bruits.  No JVD.  No cervical lymphadenopathy.  No thyromegaly.    CHEST: Breath sounds clear bilaterally.  Normal respiratory effort  CARDIOVASCULAR: Normal rate.  Regular rhythm.  No murmurs.  No rub.  No gallops.  ABDOMEN: Bowel sounds normal.  Soft.  No tenderness.  No organomegaly.  PERIPHERAL VASCULAR: No cyanosis.  No clubbing.  No edema.  NEUROLOGIC: No focal findings.  Cranial nerves intact.  Moves all extremities equally.  Gait normal though she has some limp on the right.  MENTAL STATUS: Alert.  Oriented x 3.  Back with decreased Range of motion.

## 2020-02-14 RX ORDER — BENZONATATE 100 MG/1
CAPSULE ORAL
Qty: 30 CAPSULE | Refills: 0 | Status: SHIPPED | OUTPATIENT
Start: 2020-02-14 | End: 2021-04-23

## 2020-03-18 RX ORDER — AMLODIPINE BESYLATE 2.5 MG/1
TABLET ORAL
Qty: 90 TABLET | Refills: 3 | Status: SHIPPED | OUTPATIENT
Start: 2020-03-18 | End: 2021-03-18

## 2020-05-18 RX ORDER — MONTELUKAST SODIUM 10 MG/1
TABLET ORAL
Qty: 90 TABLET | Refills: 3 | Status: SHIPPED | OUTPATIENT
Start: 2020-05-18 | End: 2021-03-18

## 2020-08-28 ENCOUNTER — OFFICE VISIT (OUTPATIENT)
Dept: FAMILY MEDICINE | Facility: CLINIC | Age: 80
End: 2020-08-28
Payer: MEDICARE

## 2020-08-28 VITALS
HEIGHT: 65 IN | HEART RATE: 70 BPM | TEMPERATURE: 98 F | DIASTOLIC BLOOD PRESSURE: 74 MMHG | BODY MASS INDEX: 24.16 KG/M2 | SYSTOLIC BLOOD PRESSURE: 139 MMHG | WEIGHT: 145 LBS

## 2020-08-28 DIAGNOSIS — Z00.00 ROUTINE HISTORY AND PHYSICAL EXAMINATION OF ADULT: Primary | ICD-10-CM

## 2020-08-28 DIAGNOSIS — F41.1 ANXIETY STATE: ICD-10-CM

## 2020-08-28 DIAGNOSIS — F33.0 MILD EPISODE OF RECURRENT MAJOR DEPRESSIVE DISORDER: ICD-10-CM

## 2020-08-28 DIAGNOSIS — E78.5 HYPERLIPIDEMIA, UNSPECIFIED HYPERLIPIDEMIA TYPE: ICD-10-CM

## 2020-08-28 DIAGNOSIS — G89.29 CHRONIC RIGHT-SIDED LOW BACK PAIN WITH RIGHT-SIDED SCIATICA: ICD-10-CM

## 2020-08-28 DIAGNOSIS — I10 ESSENTIAL HYPERTENSION: ICD-10-CM

## 2020-08-28 DIAGNOSIS — M51.37 DEGENERATION OF LUMBAR OR LUMBOSACRAL INTERVERTEBRAL DISC: ICD-10-CM

## 2020-08-28 DIAGNOSIS — M81.0 OSTEOPOROSIS, UNSPECIFIED OSTEOPOROSIS TYPE, UNSPECIFIED PATHOLOGICAL FRACTURE PRESENCE: ICD-10-CM

## 2020-08-28 DIAGNOSIS — M54.41 CHRONIC RIGHT-SIDED LOW BACK PAIN WITH RIGHT-SIDED SCIATICA: ICD-10-CM

## 2020-08-28 DIAGNOSIS — G43.909 MIGRAINE WITHOUT STATUS MIGRAINOSUS, NOT INTRACTABLE, UNSPECIFIED MIGRAINE TYPE: ICD-10-CM

## 2020-08-28 PROCEDURE — 99397 PER PM REEVAL EST PAT 65+ YR: CPT | Mod: S$GLB,,, | Performed by: FAMILY MEDICINE

## 2020-08-28 PROCEDURE — 3075F PR MOST RECENT SYSTOLIC BLOOD PRESS GE 130-139MM HG: ICD-10-PCS | Mod: CPTII,S$GLB,, | Performed by: FAMILY MEDICINE

## 2020-08-28 PROCEDURE — 99999 PR PBB SHADOW E&M-EST. PATIENT-LVL IV: ICD-10-PCS | Mod: PBBFAC,,, | Performed by: FAMILY MEDICINE

## 2020-08-28 PROCEDURE — 3075F SYST BP GE 130 - 139MM HG: CPT | Mod: CPTII,S$GLB,, | Performed by: FAMILY MEDICINE

## 2020-08-28 PROCEDURE — 3078F PR MOST RECENT DIASTOLIC BLOOD PRESSURE < 80 MM HG: ICD-10-PCS | Mod: CPTII,S$GLB,, | Performed by: FAMILY MEDICINE

## 2020-08-28 PROCEDURE — 99397 PR PREVENTIVE VISIT,EST,65 & OVER: ICD-10-PCS | Mod: S$GLB,,, | Performed by: FAMILY MEDICINE

## 2020-08-28 PROCEDURE — 3078F DIAST BP <80 MM HG: CPT | Mod: CPTII,S$GLB,, | Performed by: FAMILY MEDICINE

## 2020-08-28 PROCEDURE — 99999 PR PBB SHADOW E&M-EST. PATIENT-LVL IV: CPT | Mod: PBBFAC,,, | Performed by: FAMILY MEDICINE

## 2020-08-28 RX ORDER — ALBUTEROL SULFATE 90 UG/1
AEROSOL, METERED RESPIRATORY (INHALATION)
COMMUNITY
Start: 2020-03-18 | End: 2020-08-28 | Stop reason: SDUPTHER

## 2020-08-28 RX ORDER — DEXTROMETHORPHAN HYDROBROMIDE, GUAIFENESIN 5; 100 MG/5ML; MG/5ML
1300 LIQUID ORAL EVERY 8 HOURS
Refills: 0 | COMMUNITY
Start: 2020-08-28 | End: 2023-07-07 | Stop reason: SDUPTHER

## 2020-08-28 RX ORDER — TRAZODONE HYDROCHLORIDE 50 MG/1
50 TABLET ORAL NIGHTLY
Qty: 30 TABLET | Refills: 5 | Status: SHIPPED | OUTPATIENT
Start: 2020-08-28 | End: 2021-01-20 | Stop reason: ALTCHOICE

## 2020-08-28 RX ORDER — FLUTICASONE PROPIONATE AND SALMETEROL 100; 50 UG/1; UG/1
POWDER RESPIRATORY (INHALATION)
COMMUNITY
Start: 2020-03-18 | End: 2020-08-28 | Stop reason: SDUPTHER

## 2020-08-28 RX ORDER — MELATONIN 5 MG
TABLET, SUBLINGUAL SUBLINGUAL
COMMUNITY
End: 2021-01-20

## 2020-08-29 NOTE — PROGRESS NOTES
Physical exam.  She has noted some increased migraine headaches since pandemic.  She has anxiety and depression.  No SI/HI.  No nausea vomiting.  Headaches frontal occasional photophobia similar to headaches that she has had intermittently for many years.  She has chronic issues sleeping.  She had an event monitor at [place] without significant arrhythmias noted.  Sees Cardiology.  Osteoporosis due for follow-up imaging.  Hypertension controlled.  Hyperlipidemia compliant medication.  Chronic lower back pain with some symptoms occasionally to the right side.      Marychuy was seen today for annual exam, headache and anxiety.    Diagnoses and all orders for this visit:    Routine history and physical examination of adult  -     DXA Bone Density Spine And Hip; Future    Migraine without status migrainosus, not intractable, unspecified migraine type    Anxiety state    Essential hypertension    Mild episode of recurrent major depressive disorder    Osteoporosis, unspecified osteoporosis type, unspecified pathological fracture presence  -     DXA Bone Density Spine And Hip; Future    Hyperlipidemia, unspecified hyperlipidemia type    Degeneration of lumbar or lumbosacral intervertebral disc    Chronic right-sided low back pain with right-sided sciatica    Other orders  -     traZODone (DESYREL) 50 MG tablet; Take 1 tablet (50 mg total) by mouth every evening.  -     acetaminophen (TYLENOL) 650 MG TbSR; Take 2 tablets (1,300 mg total) by mouth every 8 (eight) hours.      Continue other medication as currently.  Follow-up in 1 month.  Reviewed laboratory.            Past Medical History:  Past Medical History:   Diagnosis Date    Anxiety state 1/23/2014    CAD (coronary artery disease)     Carotid stenosis 3/25/2013    Overview:  Calcific, nonobstructive (1/31/12, 3/19/13, [facility])     Chronic back pain     Chronic bronchitis     Chronic gastritis     Chronic rhinitis     Closed displaced fracture of middle  phalanx of right ring finger 1/7/2015    DDD (degenerative disc disease), cervical     Degeneration of lumbar or lumbosacral intervertebral disc 7/2/2014    Diverticulosis     Fractures 2015    right ring finger    GERD (gastroesophageal reflux disease)     History of stroke     History of stroke     ?    HTN (hypertension)     Hyperlipidemia     Inflammatory polyps of colon 3/17/15    Injury of extensor tendon of hand 1/7/2015    Lumbosacral spondylosis 7/2/2014    Migraine headache     Mild aortic stenosis     Osteoporosis     Syncope     Thyroid nodule      Past Surgical History:   Procedure Laterality Date    CARDIAC CATHETERIZATION  12/11/2014    LAD mid-vessel eccentric stenosis of around 50%    CARDIAC CATHETERIZATION  11/14/2017    Eccentric stenosis noted in the mid LAD of around 40%. IFR performed showing no evidence of hemodynamically significant stenosis.    CARDIAC CATHETERIZATION  07/26/2018    Non obstructive coronary disease    CARPAL TUNNEL RELEASE Right     CATARACT EXTRACTION Bilateral     COLONOSCOPY W/ BIOPSIES  8/26/2010    benign mucosa    ESOPHAGOGASTRODUODENOSCOPY  2/15/2012    ESOPHAGOGASTRODUODENOSCOPY  3/2015    FINE NEEDLE ASPIRATION      thyroid - colloid    FINE NEEDLE ASPIRATION  1/15/2014    Thyroid- benign    HYSTERECTOMY      Lower extremitry angiogram  06/06/2014    no significant disease    OOPHORECTOMY       Review of patient's allergies indicates:   Allergen Reactions    Codeine Nausea And Vomiting    Sertraline Nausea And Vomiting     Unknown^    Sulfa (sulfonamide antibiotics) Rash     Current Outpatient Medications on File Prior to Visit   Medication Sig Dispense Refill    albuterol (PROAIR HFA) 90 mcg/actuation inhaler Inhale 2 puffs into the lungs every 6 (six) hours as needed for Wheezing. 3 Inhaler 3    amLODIPine (NORVASC) 2.5 MG tablet TAKE 1 TABLET BY MOUTH EVERY DAY 90 tablet 3    aspirin (ECOTRIN) 81 MG EC tablet Take 81 mg by  mouth.      atorvastatin (LIPITOR) 80 MG tablet Take 40 mg by mouth once daily.      benzonatate (TESSALON) 100 MG capsule TAKE 1 CAPSULE BY MOUTH 3TIMES A DAY AS NEEDED FORCOUGH 30 capsule 0    besifloxacin (BESIVANCE) 0.6 % DrpS Apply to eye.      cetirizine 10 mg TbDL Take 10 mg by mouth as needed.      difluprednate (DUREZOL) 0.05 % Drop ophthalmic solution PLACE 1 DROP INTO THE LEFT EYE 3 TIMES DAILY. START AFTER SURGERY      FLUoxetine 20 MG capsule TAKE 3 CAPSULES BY MOUTH ONCE DAILY. 270 capsule 1    fluticasone-salmeterol 100-50 mcg/dose (ADVAIR DISKUS) 100-50 mcg/dose diskus inhaler INHALE 1 PUFF INTO THE LUNGS TWICE A DAY 60 each 5    melatonin 5 mg Subl Place under the tongue as needed.      metoprolol succinate (TOPROL-XL) 50 MG 24 hr tablet Take 50 mg by mouth once daily.      montelukast (SINGULAIR) 10 mg tablet TAKE 1 TABLET BY MOUTH EVERY DAY 90 tablet 3    nitroGLYCERIN (NITROSTAT) 0.4 MG SL tablet Place 0.4 mg under the tongue every 5 (five) minutes as needed for Chest pain.      pantoprazole (PROTONIX) 40 MG tablet Take 1 tablet (40 mg total) by mouth once daily. 90 tablet 3    meclizine (ANTIVERT) 25 mg tablet Take 0.5 tablets (12.5 mg total) by mouth 2 (two) times daily as needed for Dizziness. 5 tablet 0    [DISCONTINUED] albuterol (PROVENTIL/VENTOLIN HFA) 90 mcg/actuation inhaler INHALE 2 PUFFS INTO THE LUNGS EVERY 6 HOURS AS NEEDED FOR WHEEZING      [DISCONTINUED] besifloxacin (BESIVANCE) 0.6 % DrpS 1 drop.      [DISCONTINUED] fluticasone-salmeterol diskus inhaler 100-50 mcg INHALE 1 PUFF INTO THE LUNGS TWICE DAILY       No current facility-administered medications on file prior to visit.      Social History     Socioeconomic History    Marital status:      Spouse name: Not on file    Number of children: Not on file    Years of education: Not on file    Highest education level: Not on file   Occupational History    Not on file   Social Needs    Financial resource  "strain: Not on file    Food insecurity     Worry: Not on file     Inability: Not on file    Transportation needs     Medical: Not on file     Non-medical: Not on file   Tobacco Use    Smoking status: Never Smoker   Substance and Sexual Activity    Alcohol use: No    Drug use: Not on file    Sexual activity: Not on file   Lifestyle    Physical activity     Days per week: Not on file     Minutes per session: Not on file    Stress: Not on file   Relationships    Social connections     Talks on phone: Not on file     Gets together: Not on file     Attends Synagogue service: Not on file     Active member of club or organization: Not on file     Attends meetings of clubs or organizations: Not on file     Relationship status: Not on file   Other Topics Concern    Not on file   Social History Narrative    Not on file     Family History   Problem Relation Age of Onset    Hypertension Mother     Throat cancer Mother     Heart attack Mother 60        MI    Hypertension Sister     Hypertension Father     Thyroid disease Sister     Stroke Sister        ROS:  GENERAL: No fever, chills,  or significant weight changes.  HEENT: No new hearing complaints.  No dysphagia  Eyes: No vision complaints  CHEST: Denies CANELA, cyanosis, wheezing.  CARDIOVASCULAR: Denies chest pain, PND, orthopnea.  ABDOMEN: Appetite fine. Denies diarrhea, abdominal pain, hematemesis or blood in stool.  URINARY: No flank pain, dysuria or hematuria.  MUSCULOSKELETAL: No warmth swelling or tenderness of the joints.  Positive chronic back pain.  NEUROLOGIC: No focal weakness numbness or paresthesia  PSYCHIATRIC:  Positive depression.  No SI/HI          Vitals:    08/28/20 1029   BP: 139/74   Pulse: 70   Temp: 97.9 °F (36.6 °C)   Weight: 65.8 kg (145 lb)   Height: 5' 5" (1.651 m)     Wt Readings from Last 3 Encounters:   08/28/20 65.8 kg (145 lb)   01/23/20 69.3 kg (152 lb 12.8 oz)   11/11/19 66.2 kg (145 lb 15.1 oz)       OBJECTIVE:   APPEARANCE: " Well nourished, well developed, in no acute distress.    HEAD: Normocephalic.  Atraumatic.  No sinus tenderness.  EYES:   Right eye: Pupil reactive.  Conjunctiva clear.    Left eye: Pupil reactive.  Conjunctiva clear.  EOMI.    EARS: TM's intact. Light reflex normal. No retraction or perforation.    NOSE:  clear.  MOUTH & THROAT:  No pharyngeal erythema or exudate. No lesions.  NECK: Supple. No bruits.  No JVD.  No cervical lymphadenopathy.  No thyromegaly.    CHEST: Breath sounds clear bilaterally.  Normal respiratory effort  CARDIOVASCULAR: Normal rate.  Regular rhythm.  No murmurs.  No rub.  No gallops.  ABDOMEN: Bowel sounds normal.  Soft.  No tenderness.  No organomegaly.  PERIPHERAL VASCULAR: No cyanosis.  No clubbing.  No edema.  MENTAL STATUS: Alert.  Oriented x 3.  NEUROLOGIC: Cranial nerves two through 12 are intact. Motor strength is normal in the upper and lower extremities proximally and distally.  gait is normal.

## 2020-10-08 ENCOUNTER — TELEPHONE (OUTPATIENT)
Dept: FAMILY MEDICINE | Facility: CLINIC | Age: 80
End: 2020-10-08

## 2020-10-08 NOTE — TELEPHONE ENCOUNTER
----- Message from Flavia Armenta sent at 10/8/2020  9:48 AM CDT -----  Contact: ha Humphreys called in regards to rescheduling appt & dexascan. Please call back at [phone]. Thanks jh

## 2020-10-09 NOTE — TELEPHONE ENCOUNTER
No answer when called, patient has appts scheduled on 10/14, that she scheduled thru the operators

## 2020-10-14 ENCOUNTER — HOSPITAL ENCOUNTER (OUTPATIENT)
Dept: RADIOLOGY | Facility: HOSPITAL | Age: 80
Discharge: HOME | End: 2020-10-14
Attending: FAMILY MEDICINE
Payer: MEDICARE

## 2020-10-14 ENCOUNTER — OFFICE VISIT (OUTPATIENT)
Dept: FAMILY MEDICINE | Facility: CLINIC | Age: 80
End: 2020-10-14
Payer: MEDICARE

## 2020-10-14 VITALS
DIASTOLIC BLOOD PRESSURE: 82 MMHG | TEMPERATURE: 98 F | HEIGHT: 65 IN | BODY MASS INDEX: 24.32 KG/M2 | WEIGHT: 146 LBS | SYSTOLIC BLOOD PRESSURE: 138 MMHG | HEART RATE: 73 BPM

## 2020-10-14 DIAGNOSIS — R07.81 RIB PAIN ON LEFT SIDE: ICD-10-CM

## 2020-10-14 DIAGNOSIS — Z00.00 ROUTINE HISTORY AND PHYSICAL EXAMINATION OF ADULT: ICD-10-CM

## 2020-10-14 DIAGNOSIS — I10 ESSENTIAL HYPERTENSION: ICD-10-CM

## 2020-10-14 DIAGNOSIS — M81.0 OSTEOPOROSIS, UNSPECIFIED OSTEOPOROSIS TYPE, UNSPECIFIED PATHOLOGICAL FRACTURE PRESENCE: ICD-10-CM

## 2020-10-14 DIAGNOSIS — R55 SYNCOPE, UNSPECIFIED SYNCOPE TYPE: Primary | ICD-10-CM
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
## 2020-10-14 PROCEDURE — G0008 FLU VACCINE - QUADRIVALENT - ADJUVANTED: ICD-10-PCS | Mod: S$GLB,,, | Performed by: FAMILY MEDICINE

## 2020-10-14 PROCEDURE — 77080 DXA BONE DENSITY AXIAL: CPT | Mod: 26,,, | Performed by: RADIOLOGY

## 2020-10-14 PROCEDURE — 3079F DIAST BP 80-89 MM HG: CPT | Mod: CPTII,S$GLB,, | Performed by: FAMILY MEDICINE

## 2020-10-14 PROCEDURE — 71101 X-RAY EXAM UNILAT RIBS/CHEST: CPT | Mod: 26,LT,, | Performed by: RADIOLOGY

## 2020-10-14 PROCEDURE — 99214 OFFICE O/P EST MOD 30 MIN: CPT | Mod: 25,S$GLB,, | Performed by: FAMILY MEDICINE

## 2020-10-14 PROCEDURE — 99214 PR OFFICE/OUTPT VISIT, EST, LEVL IV, 30-39 MIN: ICD-10-PCS | Mod: 25,S$GLB,, | Performed by: FAMILY MEDICINE

## 2020-10-14 PROCEDURE — 99999 PR PBB SHADOW E&M-EST. PATIENT-LVL V: ICD-10-PCS | Mod: PBBFAC,,, | Performed by: FAMILY MEDICINE

## 2020-10-14 PROCEDURE — 1125F PR PAIN SEVERITY QUANTIFIED, PAIN PRESENT: ICD-10-PCS | Mod: S$GLB,,, | Performed by: FAMILY MEDICINE

## 2020-10-14 PROCEDURE — 77080 DXA BONE DENSITY AXIAL: CPT | Mod: TC,PO

## 2020-10-14 PROCEDURE — 1101F PT FALLS ASSESS-DOCD LE1/YR: CPT | Mod: CPTII,S$GLB,, | Performed by: FAMILY MEDICINE

## 2020-10-14 PROCEDURE — 3075F PR MOST RECENT SYSTOLIC BLOOD PRESS GE 130-139MM HG: ICD-10-PCS | Mod: CPTII,S$GLB,, | Performed by: FAMILY MEDICINE

## 2020-10-14 PROCEDURE — 90694 FLU VACCINE - QUADRIVALENT - ADJUVANTED: ICD-10-PCS | Mod: S$GLB,,, | Performed by: FAMILY MEDICINE

## 2020-10-14 PROCEDURE — 3075F SYST BP GE 130 - 139MM HG: CPT | Mod: CPTII,S$GLB,, | Performed by: FAMILY MEDICINE

## 2020-10-14 PROCEDURE — 3079F PR MOST RECENT DIASTOLIC BLOOD PRESSURE 80-89 MM HG: ICD-10-PCS | Mod: CPTII,S$GLB,, | Performed by: FAMILY MEDICINE

## 2020-10-14 PROCEDURE — 77080 DEXA BONE DENSITY SPINE HIP: ICD-10-PCS | Mod: 26,,, | Performed by: RADIOLOGY

## 2020-10-14 PROCEDURE — 1125F AMNT PAIN NOTED PAIN PRSNT: CPT | Mod: S$GLB,,, | Performed by: FAMILY MEDICINE

## 2020-10-14 PROCEDURE — 71101 XR RIBS MIN 3 VIEWS W/ PA CHEST LEFT: ICD-10-PCS | Mod: 26,LT,, | Performed by: RADIOLOGY

## 2020-10-14 PROCEDURE — 1101F PR PT FALLS ASSESS DOC 0-1 FALLS W/OUT INJ PAST YR: ICD-10-PCS | Mod: CPTII,S$GLB,, | Performed by: FAMILY MEDICINE

## 2020-10-14 PROCEDURE — 90694 VACC AIIV4 NO PRSRV 0.5ML IM: CPT | Mod: S$GLB,,, | Performed by: FAMILY MEDICINE

## 2020-10-14 PROCEDURE — G0008 ADMIN INFLUENZA VIRUS VAC: HCPCS | Mod: S$GLB,,, | Performed by: FAMILY MEDICINE

## 2020-10-14 PROCEDURE — 99999 PR PBB SHADOW E&M-EST. PATIENT-LVL V: CPT | Mod: PBBFAC,,, | Performed by: FAMILY MEDICINE

## 2020-10-14 PROCEDURE — 1159F PR MEDICATION LIST DOCUMENTED IN MEDICAL RECORD: ICD-10-PCS | Mod: S$GLB,,, | Performed by: FAMILY MEDICINE

## 2020-10-14 PROCEDURE — 1159F MED LIST DOCD IN RCRD: CPT | Mod: S$GLB,,, | Performed by: FAMILY MEDICINE

## 2020-10-14 RX ORDER — IBANDRONATE SODIUM 150 MG/1
150 TABLET, FILM COATED ORAL
Qty: 3 TABLET | Refills: 3 | Status: SHIPPED | OUTPATIENT
Start: 2020-10-14 | End: 2021-05-24

## 2020-10-14 NOTE — PROGRESS NOTES
Patient states that she got up from kitchen table and went by the sink last night.  She was up about a minute and then felt dizzy lightheaded and states passed out briefly.  She did hit her left posterolateral ribs and has some discomfort at the area.  She denies any other injury.  No back pain or shortness of breath.  No fever chills.  She denied any associated palpitations.  Symptoms apparently brief period no incontinence.  She has had prior episodes of syncope.  She had event monitor earlier this year without apparent arrhythmia noted.  She is actually pending appointment tomorrow with cardiology of which she was not aware.  She denies any chest pain shortness of breath.  She gets minimal dyspnea on exertion.  She denies any palpitations.  She does have hypertension states compliance medication.        Marychuy was seen today for follow-up and loss of consciousness.    Diagnoses and all orders for this visit:    Syncope, unspecified syncope type  -     Comprehensive Metabolic Panel; Future  -     CBC auto differential; Future    Essential hypertension  -     CBC auto differential; Future    Osteoporosis, unspecified osteoporosis type, unspecified pathological fracture presence  -     Comprehensive Metabolic Panel; Future  -     Vitamin D; Future  -     XR Ribs Min 3 Views w/PA Chest Left; Future    Fall, initial encounter  -     XR Ribs Min 3 Views w/PA Chest Left; Future    Rib pain on left side  -     XR Ribs Min 3 Views w/PA Chest Left; Future    Other orders  -     ibandronate (BONIVA) 150 mg tablet; Take 1 tablet (150 mg total) by mouth every 30 days.  -     Influenza - Quadrivalent (Adjuvanted); Future  -     Influenza - Quadrivalent (Adjuvanted)     Recurrent syncope unclear etiology.  She is not obviously orthostatic today.  Laboratory above.  Continue current medication.  Keep follow-up appointment scheduled with cardiology tomorrow.              Past Medical History:  Past Medical History:    Diagnosis Date    Anxiety state 1/23/2014    CAD (coronary artery disease)     Carotid stenosis 3/25/2013    Overview:  Calcific, nonobstructive (1/31/12, 3/19/13, [facility])     Chronic back pain     Chronic bronchitis     Chronic gastritis     Chronic rhinitis     Closed displaced fracture of middle phalanx of right ring finger 1/7/2015    DDD (degenerative disc disease), cervical     Degeneration of lumbar or lumbosacral intervertebral disc 7/2/2014    Diverticulosis     Fractures 2015    right ring finger    GERD (gastroesophageal reflux disease)     History of stroke     History of stroke     ?    HTN (hypertension)     Hyperlipidemia     Inflammatory polyps of colon 3/17/15    Injury of extensor tendon of hand 1/7/2015    Lumbosacral spondylosis 7/2/2014    Migraine headache     Mild aortic stenosis     Osteoporosis     Syncope     Thyroid nodule      Past Surgical History:   Procedure Laterality Date    CARDIAC CATHETERIZATION  12/11/2014    LAD mid-vessel eccentric stenosis of around 50%    CARDIAC CATHETERIZATION  11/14/2017    Eccentric stenosis noted in the mid LAD of around 40%. IFR performed showing no evidence of hemodynamically significant stenosis.    CARDIAC CATHETERIZATION  07/26/2018    Non obstructive coronary disease    CARPAL TUNNEL RELEASE Right     CATARACT EXTRACTION Bilateral     COLONOSCOPY W/ BIOPSIES  8/26/2010    benign mucosa    ESOPHAGOGASTRODUODENOSCOPY  2/15/2012    ESOPHAGOGASTRODUODENOSCOPY  3/2015    FINE NEEDLE ASPIRATION      thyroid - colloid    FINE NEEDLE ASPIRATION  1/15/2014    Thyroid- benign    HYSTERECTOMY      Lower extremitry angiogram  06/06/2014    no significant disease    OOPHORECTOMY       Review of patient's allergies indicates:   Allergen Reactions    Codeine Nausea And Vomiting    Sertraline Nausea And Vomiting     Unknown^    Sulfa (sulfonamide antibiotics) Rash     Current Outpatient Medications on File Prior to Visit    Medication Sig Dispense Refill    acetaminophen (TYLENOL) 650 MG TbSR Take 2 tablets (1,300 mg total) by mouth every 8 (eight) hours.  0    albuterol (PROAIR HFA) 90 mcg/actuation inhaler Inhale 2 puffs into the lungs every 6 (six) hours as needed for Wheezing. 3 Inhaler 3    amLODIPine (NORVASC) 2.5 MG tablet TAKE 1 TABLET BY MOUTH EVERY DAY 90 tablet 3    aspirin (ECOTRIN) 81 MG EC tablet Take 81 mg by mouth.      atorvastatin (LIPITOR) 80 MG tablet Take 40 mg by mouth once daily.      benzonatate (TESSALON) 100 MG capsule TAKE 1 CAPSULE BY MOUTH 3TIMES A DAY AS NEEDED FORCOUGH 30 capsule 0    besifloxacin (BESIVANCE) 0.6 % DrpS Apply to eye.      cetirizine 10 mg TbDL Take 10 mg by mouth as needed.      difluprednate (DUREZOL) 0.05 % Drop ophthalmic solution PLACE 1 DROP INTO THE LEFT EYE 3 TIMES DAILY. START AFTER SURGERY      FLUoxetine 20 MG capsule TAKE 3 CAPSULES BY MOUTH ONCE DAILY. 270 capsule 1    fluticasone-salmeterol 100-50 mcg/dose (ADVAIR DISKUS) 100-50 mcg/dose diskus inhaler INHALE 1 PUFF INTO THE LUNGS TWICE A DAY 60 each 5    melatonin 5 mg Subl Place under the tongue as needed.      metoprolol succinate (TOPROL-XL) 50 MG 24 hr tablet Take 50 mg by mouth once daily.      montelukast (SINGULAIR) 10 mg tablet TAKE 1 TABLET BY MOUTH EVERY DAY 90 tablet 3    nitroGLYCERIN (NITROSTAT) 0.4 MG SL tablet Place 0.4 mg under the tongue every 5 (five) minutes as needed for Chest pain.      pantoprazole (PROTONIX) 40 MG tablet Take 1 tablet (40 mg total) by mouth once daily. 90 tablet 3    traZODone (DESYREL) 50 MG tablet Take 1 tablet (50 mg total) by mouth every evening. 30 tablet 5    meclizine (ANTIVERT) 25 mg tablet Take 0.5 tablets (12.5 mg total) by mouth 2 (two) times daily as needed for Dizziness. 5 tablet 0     No current facility-administered medications on file prior to visit.      Social History     Socioeconomic History    Marital status:      Spouse name: Not on  "file    Number of children: Not on file    Years of education: Not on file    Highest education level: Not on file   Occupational History    Not on file   Social Needs    Financial resource strain: Not on file    Food insecurity     Worry: Not on file     Inability: Not on file    Transportation needs     Medical: Not on file     Non-medical: Not on file   Tobacco Use    Smoking status: Never Smoker   Substance and Sexual Activity    Alcohol use: No    Drug use: Not on file    Sexual activity: Not on file   Lifestyle    Physical activity     Days per week: Not on file     Minutes per session: Not on file    Stress: Not on file   Relationships    Social connections     Talks on phone: Not on file     Gets together: Not on file     Attends Faith service: Not on file     Active member of club or organization: Not on file     Attends meetings of clubs or organizations: Not on file     Relationship status: Not on file   Other Topics Concern    Not on file   Social History Narrative    Not on file     Family History   Problem Relation Age of Onset    Hypertension Mother     Throat cancer Mother     Heart attack Mother 60        MI    Hypertension Sister     Hypertension Father     Thyroid disease Sister     Stroke Sister            ROS:  GENERAL: No fever, chills,  or significant weight changes.   CARDIOVASCULAR: Denies chest pain, PND, orthopnea.  ABDOMEN: Appetite fine. Denies diarrhea, abdominal pain, hematemesis or blood in stool.  URINARY: No flank pain, dysuria or hematuria.    Vitals:    10/14/20 0955 10/14/20 1104 10/14/20 1105   BP: (!) 180/90 132/82 138/82   Pulse: 66 69 73   Temp: 97.9 °F (36.6 °C)     Weight: 66.2 kg (146 lb)     Height: 5' 5" (1.651 m)            Sitting    standing  Wt Readings from Last 3 Encounters:   10/14/20 66.2 kg (146 lb)   08/28/20 65.8 kg (145 lb)   01/23/20 69.3 kg (152 lb 12.8 oz)       OBJECTIVE:   APPEARANCE: Well nourished, well developed, in no acute " distress.    HEAD: Normocephalic.  Atraumatic.  No sinus tenderness.  EYES:   Right eye: Pupil reactive.  Conjunctiva clear.    Left eye: Pupil reactive.  Conjunctiva clear.  EOMI.    EARS: TM's intact. Light reflex normal. No retraction or perforation.    NOSE:  clear.  MOUTH & THROAT:  No pharyngeal erythema or exudate. No lesions.  NECK: Supple. No bruits.  No JVD.  No cervical lymphadenopathy.  No thyromegaly.    CHEST: Breath sounds clear bilaterally.  Normal respiratory effort.  She has some tenderness palpation left posterolateral ribs.  CARDIOVASCULAR: Normal rate.  Regular rhythm.  No murmurs.  No rub.  No gallops.  ABDOMEN: Bowel sounds normal.  Soft.  No tenderness.  No organomegaly.  PERIPHERAL VASCULAR: No cyanosis.  No clubbing.  No edema.  NEUROLOGIC: No focal findings.  MENTAL STATUS: Alert.  Oriented x 3.

## 2020-11-10 PROBLEM — N18.31 STAGE 3A CHRONIC KIDNEY DISEASE: Status: ACTIVE | Noted: 2020-11-10

## 2020-11-12 ENCOUNTER — TELEPHONE (OUTPATIENT)
Dept: FAMILY MEDICINE | Facility: CLINIC | Age: 80
End: 2020-11-12

## 2020-11-12 DIAGNOSIS — R79.89 LOW VITAMIN D LEVEL: Primary | ICD-10-CM

## 2020-11-12 DIAGNOSIS — E55.9 VITAMIN D DEFICIENCY, UNSPECIFIED: ICD-10-CM

## 2020-11-12 RX ORDER — CHOLECALCIFEROL (VITAMIN D3) 50 MCG
2000 TABLET ORAL DAILY
COMMUNITY
End: 2023-07-07

## 2020-11-12 NOTE — TELEPHONE ENCOUNTER
Vitamin  D was low.  Kidney function stable consistent with stable chronic kidney disease stage 3.  Recommend avoid NSAIDs such as ibuprofen or Aleve.  Recommend Vit D 2000 units daily. Repeat vitamin D level in 3 months.  My nurse will contact you to arrange.   Thanks,   Dr. Toribio

## 2020-11-13 ENCOUNTER — TELEPHONE (OUTPATIENT)
Dept: FAMILY MEDICINE | Facility: CLINIC | Age: 80
End: 2020-11-13

## 2020-11-13 DIAGNOSIS — Z12.31 ENCOUNTER FOR SCREENING MAMMOGRAM FOR MALIGNANT NEOPLASM OF BREAST: Primary | ICD-10-CM

## 2020-11-13 NOTE — TELEPHONE ENCOUNTER
----- Message from Lisa Boothe sent at 11/13/2020  8:31 AM CST -----  Pt called to speak with the office she forgot what she's supposed to take please reach out to pt at [phone]

## 2020-11-13 NOTE — TELEPHONE ENCOUNTER
Left detailed message on voice mail with lab results, which meds to take and when follow up lab scheduled.  Letter also mailed

## 2020-11-13 NOTE — TELEPHONE ENCOUNTER
----- Message from Tricia Armando sent at 11/13/2020  9:28 AM CST -----  Regarding: order for a mammogram  Contact: pt  Caller is requesting a call back regarding an order for a mammogram.  Please call back at [phone].  Thanks.

## 2020-12-09 ENCOUNTER — TELEPHONE (OUTPATIENT)
Dept: ADMINISTRATIVE | Facility: HOSPITAL | Age: 80
End: 2020-12-09

## 2020-12-10 ENCOUNTER — HOSPITAL ENCOUNTER (OUTPATIENT)
Dept: RADIOLOGY | Facility: HOSPITAL | Age: 80
Discharge: HOME | End: 2020-12-10
Attending: FAMILY MEDICINE
Payer: MEDICARE

## 2020-12-10 VITALS — HEIGHT: 65 IN | BODY MASS INDEX: 24.32 KG/M2 | WEIGHT: 146 LBS

## 2020-12-10 DIAGNOSIS — Z12.31 ENCOUNTER FOR SCREENING MAMMOGRAM FOR MALIGNANT NEOPLASM OF BREAST: ICD-10-CM

## 2020-12-10 PROCEDURE — 77063 BREAST TOMOSYNTHESIS BI: CPT | Mod: 26,,, | Performed by: RADIOLOGY

## 2020-12-10 PROCEDURE — 77067 SCR MAMMO BI INCL CAD: CPT | Mod: 26,,, | Performed by: RADIOLOGY

## 2020-12-10 PROCEDURE — 77063 MAMMO DIGITAL SCREENING BILAT WITH TOMO: ICD-10-PCS | Mod: 26,,, | Performed by: RADIOLOGY

## 2020-12-10 PROCEDURE — 77067 SCR MAMMO BI INCL CAD: CPT | Mod: TC,PO

## 2020-12-10 PROCEDURE — 77067 MAMMO DIGITAL SCREENING BILAT WITH TOMO: ICD-10-PCS | Mod: 26,,, | Performed by: RADIOLOGY

## 2021-01-20 ENCOUNTER — OFFICE VISIT (OUTPATIENT)
Dept: FAMILY MEDICINE | Facility: CLINIC | Age: 81
End: 2021-01-20
Payer: MEDICARE

## 2021-01-20 ENCOUNTER — LAB VISIT (OUTPATIENT)
Dept: LAB | Facility: HOSPITAL | Age: 81
End: 2021-01-20
Attending: FAMILY MEDICINE
Payer: MEDICARE

## 2021-01-20 VITALS
HEART RATE: 76 BPM | BODY MASS INDEX: 24.36 KG/M2 | DIASTOLIC BLOOD PRESSURE: 78 MMHG | WEIGHT: 146.19 LBS | SYSTOLIC BLOOD PRESSURE: 139 MMHG | TEMPERATURE: 98 F | HEIGHT: 65 IN

## 2021-01-20 DIAGNOSIS — Z63.4 BEREAVEMENT: ICD-10-CM

## 2021-01-20 DIAGNOSIS — E55.9 VITAMIN D DEFICIENCY, UNSPECIFIED: ICD-10-CM

## 2021-01-20 DIAGNOSIS — G89.29 CHRONIC NONINTRACTABLE HEADACHE, UNSPECIFIED HEADACHE TYPE: ICD-10-CM

## 2021-01-20 DIAGNOSIS — I10 ESSENTIAL HYPERTENSION: ICD-10-CM

## 2021-01-20 DIAGNOSIS — F33.0 MILD EPISODE OF RECURRENT MAJOR DEPRESSIVE DISORDER: Primary | ICD-10-CM

## 2021-01-20 DIAGNOSIS — F41.1 ANXIETY STATE: ICD-10-CM

## 2021-01-20 DIAGNOSIS — G43.909 MIGRAINE WITHOUT STATUS MIGRAINOSUS, NOT INTRACTABLE, UNSPECIFIED MIGRAINE TYPE: ICD-10-CM

## 2021-01-20 DIAGNOSIS — R51.9 CHRONIC NONINTRACTABLE HEADACHE, UNSPECIFIED HEADACHE TYPE: ICD-10-CM

## 2021-01-20 DIAGNOSIS — R79.89 LOW VITAMIN D LEVEL: ICD-10-CM

## 2021-01-20 DIAGNOSIS — E78.5 HYPERLIPIDEMIA, UNSPECIFIED HYPERLIPIDEMIA TYPE: ICD-10-CM

## 2021-01-20 PROCEDURE — 3078F DIAST BP <80 MM HG: CPT | Mod: CPTII,S$GLB,, | Performed by: FAMILY MEDICINE

## 2021-01-20 PROCEDURE — 3078F PR MOST RECENT DIASTOLIC BLOOD PRESSURE < 80 MM HG: ICD-10-PCS | Mod: CPTII,S$GLB,, | Performed by: FAMILY MEDICINE

## 2021-01-20 PROCEDURE — 1159F PR MEDICATION LIST DOCUMENTED IN MEDICAL RECORD: ICD-10-PCS | Mod: S$GLB,,, | Performed by: FAMILY MEDICINE

## 2021-01-20 PROCEDURE — 1159F MED LIST DOCD IN RCRD: CPT | Mod: S$GLB,,, | Performed by: FAMILY MEDICINE

## 2021-01-20 PROCEDURE — 3075F PR MOST RECENT SYSTOLIC BLOOD PRESS GE 130-139MM HG: ICD-10-PCS | Mod: CPTII,S$GLB,, | Performed by: FAMILY MEDICINE

## 2021-01-20 PROCEDURE — 3075F SYST BP GE 130 - 139MM HG: CPT | Mod: CPTII,S$GLB,, | Performed by: FAMILY MEDICINE

## 2021-01-20 PROCEDURE — 36415 COLL VENOUS BLD VENIPUNCTURE: CPT | Mod: PO

## 2021-01-20 PROCEDURE — 99214 PR OFFICE/OUTPT VISIT, EST, LEVL IV, 30-39 MIN: ICD-10-PCS | Mod: S$GLB,,, | Performed by: FAMILY MEDICINE

## 2021-01-20 PROCEDURE — 99999 PR PBB SHADOW E&M-EST. PATIENT-LVL III: CPT | Mod: PBBFAC,,, | Performed by: FAMILY MEDICINE

## 2021-01-20 PROCEDURE — 82306 VITAMIN D 25 HYDROXY: CPT

## 2021-01-20 PROCEDURE — 99999 PR PBB SHADOW E&M-EST. PATIENT-LVL III: ICD-10-PCS | Mod: PBBFAC,,, | Performed by: FAMILY MEDICINE

## 2021-01-20 PROCEDURE — 99214 OFFICE O/P EST MOD 30 MIN: CPT | Mod: S$GLB,,, | Performed by: FAMILY MEDICINE

## 2021-01-20 RX ORDER — MIRTAZAPINE 7.5 MG/1
7.5 TABLET, FILM COATED ORAL NIGHTLY
Qty: 30 TABLET | Refills: 1 | Status: SHIPPED | OUTPATIENT
Start: 2021-01-20 | End: 2021-03-18

## 2021-01-20 RX ORDER — KETOROLAC TROMETHAMINE 5 MG/ML
1 SOLUTION OPHTHALMIC 2 TIMES DAILY
COMMUNITY
Start: 2020-10-15 | End: 2021-04-23 | Stop reason: SDUPTHER

## 2021-01-20 SDOH — SOCIAL DETERMINANTS OF HEALTH (SDOH): DISSAPEARANCE AND DEATH OF FAMILY MEMBER: Z63.4

## 2021-01-21 LAB — 25(OH)D3+25(OH)D2 SERPL-MCNC: 33 NG/ML (ref 30–96)

## 2021-03-18 RX ORDER — FLUOXETINE HYDROCHLORIDE 20 MG/1
CAPSULE ORAL
Qty: 270 CAPSULE | Refills: 2 | Status: SHIPPED | OUTPATIENT
Start: 2021-03-18 | End: 2021-04-23

## 2021-03-18 RX ORDER — AMLODIPINE BESYLATE 2.5 MG/1
TABLET ORAL
Qty: 90 TABLET | Refills: 2 | Status: SHIPPED | OUTPATIENT
Start: 2021-03-18 | End: 2021-10-13

## 2021-03-18 RX ORDER — MONTELUKAST SODIUM 10 MG/1
TABLET ORAL
Qty: 90 TABLET | Refills: 2 | Status: SHIPPED | OUTPATIENT
Start: 2021-03-18 | End: 2023-07-07

## 2021-03-18 RX ORDER — MIRTAZAPINE 7.5 MG/1
7.5 TABLET, FILM COATED ORAL NIGHTLY
Qty: 90 TABLET | Refills: 2 | Status: SHIPPED | OUTPATIENT
Start: 2021-03-18 | End: 2023-07-07 | Stop reason: SDUPTHER

## 2021-03-31 ENCOUNTER — TELEPHONE (OUTPATIENT)
Dept: FAMILY MEDICINE | Facility: CLINIC | Age: 81
End: 2021-03-31

## 2021-04-05 ENCOUNTER — TELEPHONE (OUTPATIENT)
Dept: FAMILY MEDICINE | Facility: CLINIC | Age: 81
End: 2021-04-05

## 2021-04-23 ENCOUNTER — TELEPHONE (OUTPATIENT)
Dept: FAMILY MEDICINE | Facility: CLINIC | Age: 81
End: 2021-04-23

## 2021-04-23 ENCOUNTER — OFFICE VISIT (OUTPATIENT)
Dept: FAMILY MEDICINE | Facility: CLINIC | Age: 81
End: 2021-04-23
Payer: MEDICARE

## 2021-04-23 VITALS
DIASTOLIC BLOOD PRESSURE: 75 MMHG | HEART RATE: 65 BPM | SYSTOLIC BLOOD PRESSURE: 130 MMHG | HEIGHT: 65 IN | TEMPERATURE: 98 F | BODY MASS INDEX: 21.72 KG/M2 | WEIGHT: 130.38 LBS

## 2021-04-23 DIAGNOSIS — N18.31 STAGE 3A CHRONIC KIDNEY DISEASE: ICD-10-CM

## 2021-04-23 DIAGNOSIS — I35.0 AORTIC STENOSIS, MILD: ICD-10-CM

## 2021-04-23 DIAGNOSIS — Z86.73 HISTORY OF ISCHEMIC STROKE: Primary | ICD-10-CM

## 2021-04-23 DIAGNOSIS — I10 ESSENTIAL HYPERTENSION: ICD-10-CM

## 2021-04-23 DIAGNOSIS — F33.0 MILD EPISODE OF RECURRENT MAJOR DEPRESSIVE DISORDER: ICD-10-CM

## 2021-04-23 PROCEDURE — 1159F MED LIST DOCD IN RCRD: CPT | Mod: S$GLB,,, | Performed by: FAMILY MEDICINE

## 2021-04-23 PROCEDURE — 1126F PR PAIN SEVERITY QUANTIFIED, NO PAIN PRESENT: ICD-10-PCS | Mod: S$GLB,,, | Performed by: FAMILY MEDICINE

## 2021-04-23 PROCEDURE — 99999 PR PBB SHADOW E&M-EST. PATIENT-LVL IV: CPT | Mod: PBBFAC,,, | Performed by: FAMILY MEDICINE

## 2021-04-23 PROCEDURE — 3288F FALL RISK ASSESSMENT DOCD: CPT | Mod: CPTII,S$GLB,, | Performed by: FAMILY MEDICINE

## 2021-04-23 PROCEDURE — 99214 OFFICE O/P EST MOD 30 MIN: CPT | Mod: S$GLB,,, | Performed by: FAMILY MEDICINE

## 2021-04-23 PROCEDURE — 1101F PT FALLS ASSESS-DOCD LE1/YR: CPT | Mod: CPTII,S$GLB,, | Performed by: FAMILY MEDICINE

## 2021-04-23 PROCEDURE — 99214 PR OFFICE/OUTPT VISIT, EST, LEVL IV, 30-39 MIN: ICD-10-PCS | Mod: S$GLB,,, | Performed by: FAMILY MEDICINE

## 2021-04-23 PROCEDURE — 1159F PR MEDICATION LIST DOCUMENTED IN MEDICAL RECORD: ICD-10-PCS | Mod: S$GLB,,, | Performed by: FAMILY MEDICINE

## 2021-04-23 PROCEDURE — 3288F PR FALLS RISK ASSESSMENT DOCUMENTED: ICD-10-PCS | Mod: CPTII,S$GLB,, | Performed by: FAMILY MEDICINE

## 2021-04-23 PROCEDURE — 1126F AMNT PAIN NOTED NONE PRSNT: CPT | Mod: S$GLB,,, | Performed by: FAMILY MEDICINE

## 2021-04-23 PROCEDURE — 99999 PR PBB SHADOW E&M-EST. PATIENT-LVL IV: ICD-10-PCS | Mod: PBBFAC,,, | Performed by: FAMILY MEDICINE

## 2021-04-23 PROCEDURE — 1101F PR PT FALLS ASSESS DOC 0-1 FALLS W/OUT INJ PAST YR: ICD-10-PCS | Mod: CPTII,S$GLB,, | Performed by: FAMILY MEDICINE

## 2021-04-23 RX ORDER — FLUOXETINE HYDROCHLORIDE 20 MG/1
20 CAPSULE ORAL DAILY
Qty: 90 CAPSULE | Refills: 1 | Status: SHIPPED | OUTPATIENT
Start: 2021-04-23 | End: 2021-08-10

## 2021-04-23 RX ORDER — CLOPIDOGREL BISULFATE 75 MG/1
75 TABLET ORAL DAILY
COMMUNITY
End: 2021-05-03

## 2021-04-23 RX ORDER — ATORVASTATIN CALCIUM 80 MG/1
TABLET, FILM COATED ORAL
COMMUNITY
Start: 2021-03-19

## 2021-04-23 RX ORDER — KETOROLAC TROMETHAMINE 5 MG/ML
1 SOLUTION OPHTHALMIC 3 TIMES DAILY
COMMUNITY
End: 2024-02-21

## 2021-05-04 ENCOUNTER — DOCUMENT SCAN (OUTPATIENT)
Dept: HOME HEALTH SERVICES | Facility: HOSPITAL | Age: 81
End: 2021-05-04
Payer: MEDICARE

## 2021-07-30 RX ORDER — CLOPIDOGREL BISULFATE 75 MG/1
TABLET ORAL
Qty: 90 TABLET | Refills: 1 | Status: SHIPPED | OUTPATIENT
Start: 2021-07-30 | End: 2023-07-07

## 2021-08-10 RX ORDER — FLUOXETINE HYDROCHLORIDE 20 MG/1
CAPSULE ORAL
Qty: 90 CAPSULE | Refills: 2 | Status: SHIPPED | OUTPATIENT
Start: 2021-08-10

## 2021-10-13 RX ORDER — AMLODIPINE BESYLATE 2.5 MG/1
TABLET ORAL
Qty: 90 TABLET | Refills: 0 | Status: SHIPPED | OUTPATIENT
Start: 2021-10-13 | End: 2023-12-05

## 2022-02-10 DIAGNOSIS — I10 HTN (HYPERTENSION): ICD-10-CM

## 2022-05-26 ENCOUNTER — PATIENT OUTREACH (OUTPATIENT)
Dept: ADMINISTRATIVE | Facility: OTHER | Age: 82
End: 2022-05-26
Payer: MEDICARE

## 2022-05-26 NOTE — PROGRESS NOTES
Called #[phone]. Patient's granddaughter answered (Jose R Teo). Jose R states that she handles all of there grandmother's business. I asked about setting up an appt and she stated she will have to talk with her grandmother and give me a call back.

## 2022-11-09 ENCOUNTER — PATIENT OUTREACH (OUTPATIENT)
Dept: ADMINISTRATIVE | Facility: HOSPITAL | Age: 82
End: 2022-11-09
Payer: MEDICARE

## 2023-07-07 ENCOUNTER — OFFICE VISIT (OUTPATIENT)
Dept: FAMILY MEDICINE | Facility: CLINIC | Age: 83
End: 2023-07-07
Payer: MEDICARE

## 2023-07-07 VITALS
BODY MASS INDEX: 22.51 KG/M2 | DIASTOLIC BLOOD PRESSURE: 79 MMHG | WEIGHT: 135.13 LBS | HEART RATE: 60 BPM | TEMPERATURE: 98 F | OXYGEN SATURATION: 99 % | HEIGHT: 65 IN | SYSTOLIC BLOOD PRESSURE: 130 MMHG

## 2023-07-07 DIAGNOSIS — I48.0 PAF (PAROXYSMAL ATRIAL FIBRILLATION): ICD-10-CM

## 2023-07-07 DIAGNOSIS — F33.40 RECURRENT MAJOR DEPRESSIVE DISORDER, IN REMISSION: ICD-10-CM

## 2023-07-07 DIAGNOSIS — I10 PRIMARY HYPERTENSION: ICD-10-CM

## 2023-07-07 DIAGNOSIS — Z79.01 ANTICOAGULATED: ICD-10-CM

## 2023-07-07 DIAGNOSIS — E78.5 HYPERLIPIDEMIA, UNSPECIFIED HYPERLIPIDEMIA TYPE: ICD-10-CM

## 2023-07-07 DIAGNOSIS — N18.31 STAGE 3A CHRONIC KIDNEY DISEASE: ICD-10-CM

## 2023-07-07 DIAGNOSIS — F41.1 ANXIETY STATE: ICD-10-CM

## 2023-07-07 DIAGNOSIS — K21.9 GASTROESOPHAGEAL REFLUX DISEASE, UNSPECIFIED WHETHER ESOPHAGITIS PRESENT: ICD-10-CM

## 2023-07-07 DIAGNOSIS — G89.29 CHRONIC LOW BACK PAIN, UNSPECIFIED BACK PAIN LATERALITY, UNSPECIFIED WHETHER SCIATICA PRESENT: ICD-10-CM

## 2023-07-07 DIAGNOSIS — I25.10 NON-OCCLUSIVE CORONARY ARTERY DISEASE: ICD-10-CM

## 2023-07-07 DIAGNOSIS — Z00.00 ROUTINE HISTORY AND PHYSICAL EXAMINATION OF ADULT: Primary | ICD-10-CM

## 2023-07-07 DIAGNOSIS — M54.50 CHRONIC LOW BACK PAIN, UNSPECIFIED BACK PAIN LATERALITY, UNSPECIFIED WHETHER SCIATICA PRESENT: ICD-10-CM

## 2023-07-07 DIAGNOSIS — M81.0 OSTEOPOROSIS, UNSPECIFIED OSTEOPOROSIS TYPE, UNSPECIFIED PATHOLOGICAL FRACTURE PRESENCE: ICD-10-CM

## 2023-07-07 DIAGNOSIS — I35.0 MILD AORTIC STENOSIS: ICD-10-CM

## 2023-07-07 DIAGNOSIS — J41.0 SIMPLE CHRONIC BRONCHITIS: ICD-10-CM

## 2023-07-07 PROCEDURE — 99999 PR PBB SHADOW E&M-EST. PATIENT-LVL V: ICD-10-PCS | Mod: PBBFAC,,, | Performed by: FAMILY MEDICINE

## 2023-07-07 PROCEDURE — 3078F DIAST BP <80 MM HG: CPT | Mod: CPTII,S$GLB,, | Performed by: FAMILY MEDICINE

## 2023-07-07 PROCEDURE — 1100F PTFALLS ASSESS-DOCD GE2>/YR: CPT | Mod: CPTII,S$GLB,, | Performed by: FAMILY MEDICINE

## 2023-07-07 PROCEDURE — 1159F MED LIST DOCD IN RCRD: CPT | Mod: CPTII,S$GLB,, | Performed by: FAMILY MEDICINE

## 2023-07-07 PROCEDURE — 3075F SYST BP GE 130 - 139MM HG: CPT | Mod: CPTII,S$GLB,, | Performed by: FAMILY MEDICINE

## 2023-07-07 PROCEDURE — 3288F PR FALLS RISK ASSESSMENT DOCUMENTED: ICD-10-PCS | Mod: CPTII,S$GLB,, | Performed by: FAMILY MEDICINE

## 2023-07-07 PROCEDURE — 99999 PR PBB SHADOW E&M-EST. PATIENT-LVL V: CPT | Mod: PBBFAC,,, | Performed by: FAMILY MEDICINE

## 2023-07-07 PROCEDURE — 3078F PR MOST RECENT DIASTOLIC BLOOD PRESSURE < 80 MM HG: ICD-10-PCS | Mod: CPTII,S$GLB,, | Performed by: FAMILY MEDICINE

## 2023-07-07 PROCEDURE — 3288F FALL RISK ASSESSMENT DOCD: CPT | Mod: CPTII,S$GLB,, | Performed by: FAMILY MEDICINE

## 2023-07-07 PROCEDURE — 1159F PR MEDICATION LIST DOCUMENTED IN MEDICAL RECORD: ICD-10-PCS | Mod: CPTII,S$GLB,, | Performed by: FAMILY MEDICINE

## 2023-07-07 PROCEDURE — 99397 PR PREVENTIVE VISIT,EST,65 & OVER: ICD-10-PCS | Mod: S$GLB,,, | Performed by: FAMILY MEDICINE

## 2023-07-07 PROCEDURE — 1100F PR PT FALLS ASSESS DOC 2+ FALLS/FALL W/INJURY/YR: ICD-10-PCS | Mod: CPTII,S$GLB,, | Performed by: FAMILY MEDICINE

## 2023-07-07 PROCEDURE — 3075F PR MOST RECENT SYSTOLIC BLOOD PRESS GE 130-139MM HG: ICD-10-PCS | Mod: CPTII,S$GLB,, | Performed by: FAMILY MEDICINE

## 2023-07-07 PROCEDURE — 99397 PER PM REEVAL EST PAT 65+ YR: CPT | Mod: S$GLB,,, | Performed by: FAMILY MEDICINE

## 2023-07-07 RX ORDER — ONDANSETRON 4 MG/1
4 TABLET, FILM COATED ORAL EVERY 8 HOURS PRN
COMMUNITY
End: 2023-11-06

## 2023-07-07 RX ORDER — GABAPENTIN 100 MG/1
100 CAPSULE ORAL NIGHTLY
COMMUNITY
Start: 2023-06-26 | End: 2024-02-29

## 2023-07-07 RX ORDER — CIPROFLOXACIN 250 MG/1
250 TABLET, FILM COATED ORAL 2 TIMES DAILY
COMMUNITY
End: 2023-07-07

## 2023-07-07 RX ORDER — LORAZEPAM 0.5 MG/1
0.5 TABLET ORAL EVERY 4 HOURS PRN
COMMUNITY

## 2023-07-07 RX ORDER — MIRTAZAPINE 7.5 MG/1
7.5 TABLET, FILM COATED ORAL NIGHTLY
COMMUNITY

## 2023-07-07 RX ORDER — TOPIRAMATE 50 MG/1
TABLET, FILM COATED ORAL
COMMUNITY
Start: 2023-05-11 | End: 2023-07-07

## 2023-07-07 RX ORDER — APIXABAN 5 MG/1
TABLET, FILM COATED ORAL DAILY
COMMUNITY
Start: 2023-06-12

## 2023-07-07 RX ORDER — ACETAMINOPHEN 325 MG/1
325-650 TABLET ORAL EVERY 6 HOURS PRN
COMMUNITY

## 2023-07-07 RX ORDER — CYCLOSPORINE 0.5 MG/ML
EMULSION OPHTHALMIC
COMMUNITY
Start: 2023-05-12 | End: 2024-02-21

## 2023-07-07 RX ORDER — LOPERAMIDE HCL 2 MG
2 TABLET ORAL 4 TIMES DAILY PRN
COMMUNITY
End: 2023-11-06

## 2023-07-07 RX ORDER — TRAMADOL HYDROCHLORIDE 50 MG/1
50 TABLET ORAL EVERY 6 HOURS PRN
COMMUNITY

## 2023-07-07 NOTE — PROGRESS NOTES
Patient presents physical exam and follow-up.  She is not been seen in a couple of years.  She states that she was placed on hospice though she is not sure for what reason.  She states at this point that she was discharged from hospice.  She is not aware of any terminal illnesses.  She does have prior episode atrial fibrillation on loop recorder and is on Eliquis.  Previous non occlusive coronary disease and stroke and was on aspirin and Plavix.  Apparently she is still on all 3 of those though we had discontinued Plavix in the past.  She denies any obvious residual from the stroke.  Anxiety stable with intermittent lorazepam and mirtazapine.  Bronchitis uses inhalers stable.  Prior mild aortic stenosis not currently symptomatic.  Stage IIIA chronic kidney disease asymptomatic.  Reflux on Protonix.  Chronic low back pain uses tramadol intermittently.  Previous osteoporosis, was on ibandronate, but not taking this currently.  Unsure why not.      Marychuy was seen today for hospice folow up.    Diagnoses and all orders for this visit:    Routine history and physical examination of adult  -     CBC Auto Differential; Future  -     Comprehensive Metabolic Panel; Future  -     Lipid Panel; Future    PAF (paroxysmal atrial fibrillation)  -     CBC Auto Differential; Future  -     Ambulatory referral/consult to Cardiology; Future    Recurrent major depressive disorder, in remission    Anxiety state    Simple chronic bronchitis    Non-occlusive coronary artery disease  -     Ambulatory referral/consult to Cardiology; Future    Mild aortic stenosis  -     Ambulatory referral/consult to Cardiology; Future    Hyperlipidemia, unspecified hyperlipidemia type  -     Lipid Panel; Future    Primary hypertension    Stage 3a chronic kidney disease  -     Comprehensive Metabolic Panel; Future    Gastroesophageal reflux disease, unspecified whether esophagitis present    Osteoporosis, unspecified osteoporosis type, unspecified  pathological fracture presence  -     DXA Bone Density Axial Skeleton 1 or more sites; Future    Chronic low back pain, unspecified back pain laterality, unspecified whether sciatica present    Anticoagulated  -     CBC Auto Differential; Future      Obtain laboratory evaluation as above.  She can continue with Eliquis and aspirin, but we will have her stop the Plavix.  She may need have the Eliquis dose change as apparently she is only taking it once a day, but would like to reassess her kidney function before doing anything with this.  She can continue other current medications as presently for now.  Recommend she follow-up appointment with her cardiologist.            Past Medical History:  Past Medical History:   Diagnosis Date    Anxiety state 01/23/2014    Aortic stenosis, mild     CAD (coronary artery disease)     Carotid stenosis 03/25/2013    Overview:  Calcific, nonobstructive (1/31/12, 3/19/13, [facility])     Chronic back pain     Chronic bronchitis     Chronic gastritis     Chronic rhinitis     Closed displaced fracture of middle phalanx of right ring finger 01/07/2015    DDD (degenerative disc disease), cervical     Degeneration of lumbar or lumbosacral intervertebral disc 07/02/2014    Diverticulosis     Fractures 2015    right ring finger    GERD (gastroesophageal reflux disease)     History of stroke     History of stroke     ?    HTN (hypertension)     Hyperlipidemia     Inflammatory polyps of colon 03/17/2015    Injury of extensor tendon of hand 01/07/2015    Lumbosacral spondylosis 07/02/2014    Migraine headache     Mild aortic stenosis     Osteoporosis     PAF (paroxysmal atrial fibrillation)     Stage 3a chronic kidney disease 11/10/2020    Syncope     Thyroid nodule      Past Surgical History:   Procedure Laterality Date    CARDIAC CATHETERIZATION  12/11/2014    LAD mid-vessel eccentric stenosis of around 50%    CARDIAC CATHETERIZATION  11/14/2017    Eccentric stenosis noted in the mid LAD of  around 40%. IFR performed showing no evidence of hemodynamically significant stenosis.    CARDIAC CATHETERIZATION  07/26/2018    Non obstructive coronary disease    CARPAL TUNNEL RELEASE Right     CATARACT EXTRACTION Bilateral     COLONOSCOPY W/ BIOPSIES  8/26/2010    benign mucosa    ESOPHAGOGASTRODUODENOSCOPY  2/15/2012    ESOPHAGOGASTRODUODENOSCOPY  3/2015    FINE NEEDLE ASPIRATION      thyroid - colloid    FINE NEEDLE ASPIRATION  1/15/2014    Thyroid- benign    HYSTERECTOMY      Lower extremitry angiogram  06/06/2014    no significant disease    OOPHORECTOMY       Review of patient's allergies indicates:   Allergen Reactions    Codeine Nausea And Vomiting    Sertraline Nausea And Vomiting     Unknown^    Sulfa (sulfonamide antibiotics) Rash     Current Outpatient Medications on File Prior to Visit   Medication Sig Dispense Refill    acetaminophen (TYLENOL) 325 MG tablet Take 325-650 mg by mouth every 6 (six) hours as needed.      albuterol (PROAIR HFA) 90 mcg/actuation inhaler Inhale 2 puffs into the lungs every 6 (six) hours as needed for Wheezing. 3 Inhaler 3    amLODIPine (NORVASC) 2.5 MG tablet TAKE 1 TABLET BY MOUTH EVERY DAY 90 tablet 0    aspirin (ECOTRIN) 81 MG EC tablet Take 81 mg by mouth.      atorvastatin (LIPITOR) 80 MG tablet       difluprednate (DUREZOL) 0.05 % Drop ophthalmic solution PLACE 1 DROP INTO THE LEFT EYE 3 TIMES DAILY. START AFTER SURGERY      ELIQUIS 5 mg Tab Take by mouth Daily.      FLUoxetine 20 MG capsule TAKE ONE CAPSULE BY MOUTH EVERY DAY 90 capsule 2    gabapentin (NEURONTIN) 100 MG capsule Take 100 mg by mouth every evening.      ketorolac 0.5% (ACULAR) 0.5 % Drop Place 1 drop into both eyes 3 (three) times daily.      loperamide (IMODIUM A-D) 2 mg Tab Take 2 mg by mouth 4 (four) times daily as needed.      LORazepam (ATIVAN) 0.5 MG tablet Take 0.5 mg by mouth every 4 (four) hours as needed for Anxiety.      metoprolol succinate (TOPROL-XL) 50 MG 24 hr tablet Take 50 mg by  mouth once daily.      mirtazapine (REMERON) 7.5 MG Tab Take 7.5 mg by mouth every evening.      nitroGLYCERIN (NITROSTAT) 0.4 MG SL tablet Place 0.4 mg under the tongue every 5 (five) minutes as needed for Chest pain.      ondansetron (ZOFRAN) 4 MG tablet Take 4 mg by mouth every 8 (eight) hours as needed for Nausea.      pantoprazole (PROTONIX) 40 MG tablet Take 1 tablet (40 mg total) by mouth once daily. 90 tablet 3    RESTASIS 0.05 % ophthalmic emulsion       traMADoL (ULTRAM) 50 mg tablet Take 50 mg by mouth every 6 (six) hours as needed for Pain.      [DISCONTINUED] acetaminophen (TYLENOL) 650 MG TbSR Take 2 tablets (1,300 mg total) by mouth every 8 (eight) hours.  0    [DISCONTINUED] ciprofloxacin HCl (CIPRO) 250 MG tablet Take 250 mg by mouth 2 (two) times daily.      [DISCONTINUED] clopidogreL (PLAVIX) 75 mg tablet TAKE 1 TABLET EVERY DAY 90 tablet 1    fluticasone-salmeterol 100-50 mcg/dose (ADVAIR DISKUS) 100-50 mcg/dose diskus inhaler INHALE 1 PUFF INTO THE LUNGS TWICE A DAY 60 each 5    [DISCONTINUED] cetirizine 10 mg TbDL Take 10 mg by mouth as needed.      [DISCONTINUED] cholecalciferol, vitamin D3, (VITAMIN D3) 50 mcg (2,000 unit) Tab Take 2,000 Units by mouth once daily.      [DISCONTINUED] ibandronate (BONIVA) 150 mg tablet TAKE 1 TABLET BY MOUTH ONCE A MONTH 3 tablet 3    [DISCONTINUED] meclizine (ANTIVERT) 25 mg tablet Take 0.5 tablets (12.5 mg total) by mouth 2 (two) times daily as needed for Dizziness. 5 tablet 0    [DISCONTINUED] mirtazapine (REMERON) 7.5 MG Tab TAKE 1 TABLET (7.5 MG TOTAL) BY MOUTH EVERY EVENING. 90 tablet 2    [DISCONTINUED] montelukast (SINGULAIR) 10 mg tablet TAKE 1 TABLET BY MOUTH EVERY DAY 90 tablet 2    [DISCONTINUED] topiramate (TOPAMAX) 50 MG tablet Take by mouth.      [DISCONTINUED] traZODone (DESYREL) 50 MG tablet TAKE 1 TABLET BY MOUTH EVERY EVENING 30 tablet 5     No current facility-administered medications on file prior to visit.     Social History  "    Socioeconomic History    Marital status:    Tobacco Use    Smoking status: Never   Substance and Sexual Activity    Alcohol use: No     Family History   Problem Relation Age of Onset    Hypertension Mother     Throat cancer Mother     Heart attack Mother 60        MI    Hypertension Sister     Hypertension Father     Thyroid disease Sister     Stroke Sister            ROS:  GENERAL: No fever, chills,  or significant weight changes.   CARDIOVASCULAR: Denies PND, orthopnea or reduced exercise tolerance.  ABDOMEN: Appetite fine. Denies diarrhea, abdominal pain, hematemesis or blood in stool.  URINARY: No flank pain, dysuria or hematuria.    Vitals:    07/07/23 1339   BP: 130/79   Pulse: 60   Temp: 97.7 °F (36.5 °C)   TempSrc: Temporal   SpO2: 99%   Weight: 61.3 kg (135 lb 1.6 oz)   Height: 5' 5" (1.651 m)     Wt Readings from Last 3 Encounters:   07/07/23 61.3 kg (135 lb 1.6 oz)   04/23/21 59.1 kg (130 lb 6.4 oz)   01/20/21 66.3 kg (146 lb 3.2 oz)       OBJECTIVE:   APPEARANCE: Well nourished, well developed, in no acute distress.    HEAD: Normocephalic.  Atraumatic.  No sinus tenderness.  EYES:   Right eye: Pupil reactive.  Conjunctiva clear.    Left eye: Pupil reactive.  Conjunctiva clear.  EOMI.    EARS: TM's intact. Light reflex normal. No retraction or perforation.    NOSE:  clear.  MOUTH & THROAT:  No pharyngeal erythema or exudate. No lesions.  NECK: Supple. No bruits.  No JVD.  No cervical lymphadenopathy.  No thyromegaly.    CHEST: Breath sounds clear bilaterally.  Normal respiratory effort  CARDIOVASCULAR: Normal rate.  Regular rhythm.  No murmurs.  No rub.  No gallops.  ABDOMEN: Bowel sounds normal.  Soft.  No tenderness.  No organomegaly.  PERIPHERAL VASCULAR: No cyanosis.  No clubbing.  No edema.  NEUROLOGIC: No focal findings.  MENTAL STATUS: Alert.  Oriented x 3.        "

## 2023-07-10 ENCOUNTER — TELEPHONE (OUTPATIENT)
Dept: FAMILY MEDICINE | Facility: CLINIC | Age: 83
End: 2023-07-10
Payer: MEDICARE

## 2023-07-10 NOTE — TELEPHONE ENCOUNTER
----- Message from Arleth Witt sent at 7/10/2023  1:01 PM CDT -----  Contact: Marychuy Humphreys requests a copy of her appts be mailed to her. She has misplaced them. Also, she is unable to see the heart doctor until October.     Thanks  TS

## 2023-07-10 NOTE — TELEPHONE ENCOUNTER
----- Message from Venus Guerrier sent at 7/10/2023 11:07 AM CDT -----  Pt stated she left her after summary paperwork and would like it mailed please. Call back number is [phone]. Thx. EL

## 2023-07-27 ENCOUNTER — HOSPITAL ENCOUNTER (OUTPATIENT)
Dept: RADIOLOGY | Facility: HOSPITAL | Age: 83
Discharge: HOME | End: 2023-07-27
Attending: FAMILY MEDICINE
Payer: MEDICARE

## 2023-07-27 DIAGNOSIS — M81.0 OSTEOPOROSIS, UNSPECIFIED OSTEOPOROSIS TYPE, UNSPECIFIED PATHOLOGICAL FRACTURE PRESENCE: ICD-10-CM

## 2023-07-27 PROCEDURE — 77080 DXA BONE DENSITY AXIAL: CPT | Mod: TC,PO

## 2023-07-27 PROCEDURE — 77080 DXA BONE DENSITY AXIAL SKELETON 1 OR MORE SITES: ICD-10-PCS | Mod: 26,,, | Performed by: RADIOLOGY

## 2023-07-27 PROCEDURE — 77080 DXA BONE DENSITY AXIAL: CPT | Mod: 26,,, | Performed by: RADIOLOGY

## 2023-11-06 ENCOUNTER — OFFICE VISIT (OUTPATIENT)
Dept: FAMILY MEDICINE | Facility: CLINIC | Age: 83
End: 2023-11-06
Payer: MEDICARE

## 2023-11-06 VITALS
HEART RATE: 73 BPM | HEIGHT: 65 IN | TEMPERATURE: 98 F | DIASTOLIC BLOOD PRESSURE: 89 MMHG | SYSTOLIC BLOOD PRESSURE: 158 MMHG | WEIGHT: 136.69 LBS | BODY MASS INDEX: 22.77 KG/M2

## 2023-11-06 DIAGNOSIS — I10 PRIMARY HYPERTENSION: ICD-10-CM

## 2023-11-06 DIAGNOSIS — J06.9 UPPER RESPIRATORY TRACT INFECTION, UNSPECIFIED TYPE: Primary | ICD-10-CM

## 2023-11-06 DIAGNOSIS — K52.9 GASTROENTERITIS: ICD-10-CM

## 2023-11-06 LAB
CTP QC/QA: YES
CTP QC/QA: YES
POC MOLECULAR INFLUENZA A AGN: NEGATIVE
POC MOLECULAR INFLUENZA B AGN: NEGATIVE
SARS-COV-2 RDRP RESP QL NAA+PROBE: NEGATIVE

## 2023-11-06 PROCEDURE — 1159F MED LIST DOCD IN RCRD: CPT | Mod: CPTII,S$GLB,, | Performed by: FAMILY MEDICINE

## 2023-11-06 PROCEDURE — 3079F DIAST BP 80-89 MM HG: CPT | Mod: CPTII,S$GLB,, | Performed by: FAMILY MEDICINE

## 2023-11-06 PROCEDURE — 87502 INFLUENZA DNA AMP PROBE: CPT | Mod: QW,S$GLB,, | Performed by: FAMILY MEDICINE

## 2023-11-06 PROCEDURE — 99213 PR OFFICE/OUTPT VISIT, EST, LEVL III, 20-29 MIN: ICD-10-PCS | Mod: S$GLB,,, | Performed by: FAMILY MEDICINE

## 2023-11-06 PROCEDURE — 1101F PT FALLS ASSESS-DOCD LE1/YR: CPT | Mod: CPTII,S$GLB,, | Performed by: FAMILY MEDICINE

## 2023-11-06 PROCEDURE — 3077F SYST BP >= 140 MM HG: CPT | Mod: CPTII,S$GLB,, | Performed by: FAMILY MEDICINE

## 2023-11-06 PROCEDURE — 1159F PR MEDICATION LIST DOCUMENTED IN MEDICAL RECORD: ICD-10-PCS | Mod: CPTII,S$GLB,, | Performed by: FAMILY MEDICINE

## 2023-11-06 PROCEDURE — 99999 PR PBB SHADOW E&M-EST. PATIENT-LVL IV: CPT | Mod: PBBFAC,,, | Performed by: FAMILY MEDICINE

## 2023-11-06 PROCEDURE — 3077F PR MOST RECENT SYSTOLIC BLOOD PRESSURE >= 140 MM HG: ICD-10-PCS | Mod: CPTII,S$GLB,, | Performed by: FAMILY MEDICINE

## 2023-11-06 PROCEDURE — 87635: ICD-10-PCS | Mod: QW,S$GLB,, | Performed by: FAMILY MEDICINE

## 2023-11-06 PROCEDURE — 3288F PR FALLS RISK ASSESSMENT DOCUMENTED: ICD-10-PCS | Mod: CPTII,S$GLB,, | Performed by: FAMILY MEDICINE

## 2023-11-06 PROCEDURE — 99999 PR PBB SHADOW E&M-EST. PATIENT-LVL IV: ICD-10-PCS | Mod: PBBFAC,,, | Performed by: FAMILY MEDICINE

## 2023-11-06 PROCEDURE — 3288F FALL RISK ASSESSMENT DOCD: CPT | Mod: CPTII,S$GLB,, | Performed by: FAMILY MEDICINE

## 2023-11-06 PROCEDURE — 87502 POCT INFLUENZA A/B MOLECULAR: ICD-10-PCS | Mod: QW,S$GLB,, | Performed by: FAMILY MEDICINE

## 2023-11-06 PROCEDURE — 99213 OFFICE O/P EST LOW 20 MIN: CPT | Mod: S$GLB,,, | Performed by: FAMILY MEDICINE

## 2023-11-06 PROCEDURE — 87635 SARS-COV-2 COVID-19 AMP PRB: CPT | Mod: QW,S$GLB,, | Performed by: FAMILY MEDICINE

## 2023-11-06 PROCEDURE — 1101F PR PT FALLS ASSESS DOC 0-1 FALLS W/OUT INJ PAST YR: ICD-10-PCS | Mod: CPTII,S$GLB,, | Performed by: FAMILY MEDICINE

## 2023-11-06 PROCEDURE — 3079F PR MOST RECENT DIASTOLIC BLOOD PRESSURE 80-89 MM HG: ICD-10-PCS | Mod: CPTII,S$GLB,, | Performed by: FAMILY MEDICINE

## 2023-11-06 RX ORDER — BUTALBITAL, ACETAMINOPHEN AND CAFFEINE 50; 325; 40 MG/1; MG/1; MG/1
1 TABLET ORAL EVERY 6 HOURS PRN
COMMUNITY

## 2023-11-06 NOTE — PROGRESS NOTES
Patient states that she developed some nausea vomiting diarrhea headache and dizziness 5 nights ago.  Symptoms improved except she still has some intermittent headache and now has mild cough.  Mild dizziness.  No vertigo.  No fever.  Denies any chest pains or shortness of breath.  No sick contacts.  Current treatment Pepto-Bismol which she has since discontinued.  Blood pressure elevated, usually controlled.    Marychuy was seen today for headache, cough, emesis and diarrhea.    Diagnoses and all orders for this visit:    Upper respiratory tract infection, unspecified type    Gastroenteritis  -     POCT COVID-19 Rapid Screening  -     POCT Influenza A/B Molecular    Primary hypertension      Testing above negative.  Increase fluid intake.  She has Fioricet at home that she is used previously for headache which she can use.  Follow-up if symptoms not continuing to improve.  Recheck blood pressure nurse in 1 month.            Past Medical History:  Past Medical History:   Diagnosis Date    Anxiety state 01/23/2014    Aortic stenosis, mild     CAD (coronary artery disease)     Carotid stenosis 03/25/2013    Overview:  Calcific, nonobstructive (1/31/12, 3/19/13, [facility])     Chronic back pain     Chronic bronchitis     Chronic gastritis     Chronic rhinitis     Closed displaced fracture of middle phalanx of right ring finger 01/07/2015    DDD (degenerative disc disease), cervical     Degeneration of lumbar or lumbosacral intervertebral disc 07/02/2014    Diverticulosis     Fractures 2015    right ring finger    GERD (gastroesophageal reflux disease)     History of stroke     History of stroke     ?    HTN (hypertension)     Hyperlipidemia     Inflammatory polyps of colon 03/17/2015    Injury of extensor tendon of hand 01/07/2015    Lumbosacral spondylosis 07/02/2014    Migraine headache     Mild aortic stenosis     Osteoporosis     PAF (paroxysmal atrial fibrillation)     Stage 3a chronic kidney disease 11/10/2020     Syncope     Thyroid nodule      Past Surgical History:   Procedure Laterality Date    CARDIAC CATHETERIZATION  12/11/2014    LAD mid-vessel eccentric stenosis of around 50%    CARDIAC CATHETERIZATION  11/14/2017    Eccentric stenosis noted in the mid LAD of around 40%. IFR performed showing no evidence of hemodynamically significant stenosis.    CARDIAC CATHETERIZATION  07/26/2018    Non obstructive coronary disease    CARPAL TUNNEL RELEASE Right     CATARACT EXTRACTION Bilateral     COLONOSCOPY W/ BIOPSIES  8/26/2010    benign mucosa    ESOPHAGOGASTRODUODENOSCOPY  2/15/2012    ESOPHAGOGASTRODUODENOSCOPY  3/2015    FINE NEEDLE ASPIRATION      thyroid - colloid    FINE NEEDLE ASPIRATION  1/15/2014    Thyroid- benign    HYSTERECTOMY      Lower extremitry angiogram  06/06/2014    no significant disease    OOPHORECTOMY       Review of patient's allergies indicates:   Allergen Reactions    Codeine Nausea And Vomiting    Sertraline Nausea And Vomiting     Unknown^    Sulfa (sulfonamide antibiotics) Rash     Current Outpatient Medications on File Prior to Visit   Medication Sig Dispense Refill    acetaminophen (TYLENOL) 325 MG tablet Take 325-650 mg by mouth every 6 (six) hours as needed.      albuterol (PROAIR HFA) 90 mcg/actuation inhaler Inhale 2 puffs into the lungs every 6 (six) hours as needed for Wheezing. 3 Inhaler 3    amLODIPine (NORVASC) 2.5 MG tablet TAKE 1 TABLET BY MOUTH EVERY DAY 90 tablet 0    aspirin (ECOTRIN) 81 MG EC tablet Take 81 mg by mouth.      atorvastatin (LIPITOR) 80 MG tablet       butalbital-acetaminophen-caffeine -40 mg (FIORICET, ESGIC) -40 mg per tablet Take 1 tablet by mouth every 6 (six) hours as needed.      difluprednate (DUREZOL) 0.05 % Drop ophthalmic solution PLACE 1 DROP INTO THE LEFT EYE 3 TIMES DAILY. START AFTER SURGERY      ELIQUIS 5 mg Tab Take by mouth Daily.      FLUoxetine 20 MG capsule TAKE ONE CAPSULE BY MOUTH EVERY DAY 90 capsule 2    gabapentin (NEURONTIN) 100  "MG capsule Take 100 mg by mouth every evening.      ketorolac 0.5% (ACULAR) 0.5 % Drop Place 1 drop into both eyes 3 (three) times daily.      LORazepam (ATIVAN) 0.5 MG tablet Take 0.5 mg by mouth every 4 (four) hours as needed for Anxiety.      metoprolol succinate (TOPROL-XL) 50 MG 24 hr tablet Take 50 mg by mouth once daily.      mirtazapine (REMERON) 7.5 MG Tab Take 7.5 mg by mouth every evening.      nitroGLYCERIN (NITROSTAT) 0.4 MG SL tablet Place 0.4 mg under the tongue every 5 (five) minutes as needed for Chest pain.      pantoprazole (PROTONIX) 40 MG tablet Take 1 tablet (40 mg total) by mouth once daily. 90 tablet 3    RESTASIS 0.05 % ophthalmic emulsion       traMADoL (ULTRAM) 50 mg tablet Take 50 mg by mouth every 6 (six) hours as needed for Pain.      [DISCONTINUED] loperamide (IMODIUM A-D) 2 mg Tab Take 2 mg by mouth 4 (four) times daily as needed.      fluticasone-salmeterol 100-50 mcg/dose (ADVAIR DISKUS) 100-50 mcg/dose diskus inhaler INHALE 1 PUFF INTO THE LUNGS TWICE A DAY 60 each 5    [DISCONTINUED] ondansetron (ZOFRAN) 4 MG tablet Take 4 mg by mouth every 8 (eight) hours as needed for Nausea.       No current facility-administered medications on file prior to visit.     Social History     Socioeconomic History    Marital status:    Tobacco Use    Smoking status: Never   Substance and Sexual Activity    Alcohol use: No     Family History   Problem Relation Age of Onset    Hypertension Mother     Throat cancer Mother     Heart attack Mother 60        MI    Hypertension Sister     Hypertension Father     Thyroid disease Sister     Stroke Sister          Vitals:    11/06/23 1406   BP: (!) 158/89   Pulse: 73   Temp: 98.1 °F (36.7 °C)   TempSrc: Temporal   Weight: 62 kg (136 lb 11.2 oz)   Height: 5' 5" (1.651 m)     Wt Readings from Last 3 Encounters:   11/06/23 62 kg (136 lb 11.2 oz)   07/07/23 61.3 kg (135 lb 1.6 oz)   04/23/21 59.1 kg (130 lb 6.4 oz)       OBJECTIVE:   APPEARANCE: Well " nourished, well developed, in no acute distress.    HEAD: Normocephalic.  Atraumatic.  No sinus tenderness.  EYES:   Right eye: Pupil reactive.  Conjunctiva clear.    Left eye: Pupil reactive.  Conjunctiva clear.  EOMI.    EARS: TM's intact. Light reflex normal. No retraction or perforation.    NOSE:  clear.  MOUTH & THROAT:  No pharyngeal erythema or exudate. No lesions.  NECK: Supple. No bruits.  No JVD.  No cervical lymphadenopathy.  No thyromegaly.    CHEST: Breath sounds clear bilaterally.  Normal respiratory effort  CARDIOVASCULAR: Normal rate.  Regular rhythm.  No murmurs.  No rub.  No gallops.  ABDOMEN: Bowel sounds normal.  Soft.  No tenderness.  No organomegaly.  PERIPHERAL VASCULAR: No cyanosis.  No clubbing.  No edema.  NEUROLOGIC: No focal findings.  MENTAL STATUS: Alert.  Oriented x 3.

## 2023-12-05 RX ORDER — METOPROLOL SUCCINATE 50 MG/1
50 TABLET, EXTENDED RELEASE ORAL
Qty: 30 TABLET | Refills: 3 | Status: SHIPPED | OUTPATIENT
Start: 2023-12-05

## 2023-12-05 RX ORDER — AMLODIPINE BESYLATE 2.5 MG/1
TABLET ORAL
Qty: 30 TABLET | Refills: 3 | Status: SHIPPED | OUTPATIENT
Start: 2023-12-05 | End: 2024-02-21 | Stop reason: SDUPTHER

## 2023-12-05 NOTE — TELEPHONE ENCOUNTER
No care due was identified.  [facility] Embedded Care Due Messages. Reference number: [id number].   12/05/2023 3:11:37 PM CST

## 2023-12-06 NOTE — TELEPHONE ENCOUNTER
Refill Routing Note   Medication(s) are not appropriate for processing by [facility] for the following reason(s):        Required vitals abnormal  No active prescription written by provider    [facility] action(s):  Defer        Medication Therapy Plan: BP 11/06/23 (!) 158/89      Appointments  past 12m or future 3m with PCP    Date Provider   Last Visit   11/6/2023 Yohannes Toribio MD   Next Visit   Visit date not found Yohannes Toribio MD   ED visits in past 90 days: 0        Note composed:6:11 PM 12/05/2023

## 2023-12-06 NOTE — TELEPHONE ENCOUNTER
Refill Routing Note   Medication(s) are not appropriate for processing by [facility] for the following reason(s):        Required vitals abnormal    [facility] action(s):  Defer        Medication Therapy Plan: BP 11/06/23 (!) 158/89      Appointments  past 12m or future 3m with PCP    Date Provider   Last Visit   11/6/2023 Yohannes Toribio MD   Next Visit   Visit date not found Yohannes Toribio MD   ED visits in past 90 days: 0        Note composed:6:11 PM 12/05/2023

## 2024-02-21 ENCOUNTER — OFFICE VISIT (OUTPATIENT)
Dept: FAMILY MEDICINE | Facility: CLINIC | Age: 84
End: 2024-02-21
Payer: MEDICARE

## 2024-02-21 VITALS
DIASTOLIC BLOOD PRESSURE: 86 MMHG | SYSTOLIC BLOOD PRESSURE: 172 MMHG | WEIGHT: 134.19 LBS | BODY MASS INDEX: 22.36 KG/M2 | HEART RATE: 66 BPM | HEIGHT: 65 IN | TEMPERATURE: 98 F

## 2024-02-21 DIAGNOSIS — R47.81 SLURRED SPEECH: ICD-10-CM

## 2024-02-21 DIAGNOSIS — J41.0 SIMPLE CHRONIC BRONCHITIS: ICD-10-CM

## 2024-02-21 DIAGNOSIS — I48.0 PAF (PAROXYSMAL ATRIAL FIBRILLATION): ICD-10-CM

## 2024-02-21 DIAGNOSIS — E78.5 HYPERLIPIDEMIA, UNSPECIFIED HYPERLIPIDEMIA TYPE: ICD-10-CM

## 2024-02-21 DIAGNOSIS — Z91.81 HISTORY OF FALL: ICD-10-CM

## 2024-02-21 DIAGNOSIS — N18.31 STAGE 3A CHRONIC KIDNEY DISEASE: ICD-10-CM

## 2024-02-21 DIAGNOSIS — I10 PRIMARY HYPERTENSION: ICD-10-CM

## 2024-02-21 DIAGNOSIS — S02.2XXD CLOSED FRACTURE OF NASAL BONE WITH ROUTINE HEALING, SUBSEQUENT ENCOUNTER: ICD-10-CM

## 2024-02-21 DIAGNOSIS — Z86.73 HISTORY OF CVA (CEREBROVASCULAR ACCIDENT): ICD-10-CM

## 2024-02-21 DIAGNOSIS — I35.0 MILD AORTIC STENOSIS: ICD-10-CM

## 2024-02-21 DIAGNOSIS — I25.10 NON-OCCLUSIVE CORONARY ARTERY DISEASE: ICD-10-CM

## 2024-02-21 DIAGNOSIS — R29.818 OTHER SYMPTOMS AND SIGNS INVOLVING THE NERVOUS SYSTEM: Primary | ICD-10-CM

## 2024-02-21 DIAGNOSIS — F33.40 RECURRENT MAJOR DEPRESSIVE DISORDER, IN REMISSION: ICD-10-CM

## 2024-02-21 PROCEDURE — 3288F FALL RISK ASSESSMENT DOCD: CPT | Mod: CPTII,S$GLB,, | Performed by: FAMILY MEDICINE

## 2024-02-21 PROCEDURE — 1101F PT FALLS ASSESS-DOCD LE1/YR: CPT | Mod: CPTII,S$GLB,, | Performed by: FAMILY MEDICINE

## 2024-02-21 PROCEDURE — 1159F MED LIST DOCD IN RCRD: CPT | Mod: CPTII,S$GLB,, | Performed by: FAMILY MEDICINE

## 2024-02-21 PROCEDURE — 99215 OFFICE O/P EST HI 40 MIN: CPT | Mod: S$GLB,,, | Performed by: FAMILY MEDICINE

## 2024-02-21 PROCEDURE — 99999 PR PBB SHADOW E&M-EST. PATIENT-LVL V: CPT | Mod: PBBFAC,,, | Performed by: FAMILY MEDICINE

## 2024-02-21 PROCEDURE — 3077F SYST BP >= 140 MM HG: CPT | Mod: CPTII,S$GLB,, | Performed by: FAMILY MEDICINE

## 2024-02-21 PROCEDURE — 1126F AMNT PAIN NOTED NONE PRSNT: CPT | Mod: CPTII,S$GLB,, | Performed by: FAMILY MEDICINE

## 2024-02-21 PROCEDURE — 3079F DIAST BP 80-89 MM HG: CPT | Mod: CPTII,S$GLB,, | Performed by: FAMILY MEDICINE

## 2024-02-21 RX ORDER — MECLIZINE HYDROCHLORIDE 25 MG/1
25 TABLET ORAL 3 TIMES DAILY PRN
COMMUNITY

## 2024-02-21 RX ORDER — AMLODIPINE BESYLATE 5 MG/1
5 TABLET ORAL DAILY
Qty: 90 TABLET | Refills: 0 | Status: SHIPPED | OUTPATIENT
Start: 2024-02-21

## 2024-02-21 NOTE — PROGRESS NOTES
Patient apparently had a fall on January 22nd and went to the emergency room.  She is unclear why she fell.  She apparently did hit her face and broke her nose.  She had a follow-up visit with ENT and fracture was not felt to be surgical.  Also states that she has had speech difficulty since then.  Speech is slow she has a lot of pauses.  Apparently had some slurring, but this improved.  She is able to find her words and understand things.  She does have prior history of stroke with paroxysmal atrial fibrillation and is on Eliquis through outside provider.  Apparently has a loop recorder.  She has not seen her cardiologist in a couple of years.  Also taking aspirin.  She did have CT at the ER which did not show acute stroke at that time, but did show the previous strokes.  Depression stable current medication again through outside provider.  Chronic bronchitis without current symptoms.  3A chronic kidney disease asymptomatic.  Previous nonocclusive coronary disease without angina.  Blood pressure is elevated.  She is on statin lipids.  No dysphagia.    Marychuy was seen today for hospital follow up.    Diagnoses and all orders for this visit:    Other symptoms and signs involving the nervous system  -     MRI Brain W WO Contrast; Future  -     MRI Brain W WO Contrast    Slurred speech  -     Ambulatory referral/consult to Neurology; Future  -     Ambulatory referral/consult to Speech Therapy; Future    History of fall    History of CVA (cerebrovascular accident)  -     Ambulatory referral/consult to Neurology; Future  -     Ambulatory referral/consult to Cardiology; Future  -     Ambulatory referral/consult to Speech Therapy; Future    Closed fracture of nasal bone with routine healing, subsequent encounter    Recurrent major depressive disorder, in remission    Simple chronic bronchitis    PAF (paroxysmal atrial fibrillation)  -     Ambulatory referral/consult to Cardiology; Future    Stage 3a chronic kidney  disease    Mild aortic stenosis    Non-occlusive coronary artery disease    Other orders  -     amLODIPine (NORVASC) 5 MG tablet; Take 1 tablet (5 mg total) by mouth once daily.    Follow-up 1 month.            46 minutes spent patient evaluation record review management and documentation  Past Medical History:  Past Medical History:   Diagnosis Date    Anxiety state 01/23/2014    Aortic stenosis, mild     CAD (coronary artery disease)     Carotid stenosis 03/25/2013    Overview:  Calcific, nonobstructive (1/31/12, 3/19/13, [facility])     Chronic back pain     Chronic bronchitis     Chronic gastritis     Chronic rhinitis     Closed displaced fracture of middle phalanx of right ring finger 01/07/2015    DDD (degenerative disc disease), cervical     Degeneration of lumbar or lumbosacral intervertebral disc 07/02/2014    Diverticulosis     Fractures 2015    right ring finger    GERD (gastroesophageal reflux disease)     History of stroke     History of stroke     ?    HTN (hypertension)     Hyperlipidemia     Inflammatory polyps of colon 03/17/2015    Injury of extensor tendon of hand 01/07/2015    Lumbosacral spondylosis 07/02/2014    Migraine headache     Mild aortic stenosis     Osteoporosis     PAF (paroxysmal atrial fibrillation)     Stage 3a chronic kidney disease 11/10/2020    Status post placement of implantable loop recorder     Syncope     Thyroid nodule      Past Surgical History:   Procedure Laterality Date    CARDIAC CATHETERIZATION  12/11/2014    LAD mid-vessel eccentric stenosis of around 50%    CARDIAC CATHETERIZATION  11/14/2017    Eccentric stenosis noted in the mid LAD of around 40%. IFR performed showing no evidence of hemodynamically significant stenosis.    CARDIAC CATHETERIZATION  07/26/2018    Non obstructive coronary disease    CARPAL TUNNEL RELEASE Right     CATARACT EXTRACTION Bilateral     COLONOSCOPY W/ BIOPSIES  08/26/2010    benign mucosa    ESOPHAGOGASTRODUODENOSCOPY  02/15/2012     ESOPHAGOGASTRODUODENOSCOPY  03/2015    FINE NEEDLE ASPIRATION      thyroid - colloid    FINE NEEDLE ASPIRATION  01/15/2014    Thyroid- benign    HYSTERECTOMY      INSERTION OF IMPLANTABLE LOOP RECORDER      Lower extremitry angiogram  06/06/2014    no significant disease    OOPHORECTOMY       Review of patient's allergies indicates:   Allergen Reactions    Codeine Nausea And Vomiting    Sertraline Nausea And Vomiting     Unknown^    Sulfa (sulfonamide antibiotics) Rash     Current Outpatient Medications on File Prior to Visit   Medication Sig Dispense Refill    acetaminophen (TYLENOL) 325 MG tablet Take 325-650 mg by mouth every 6 (six) hours as needed.      aspirin (ECOTRIN) 81 MG EC tablet Take 81 mg by mouth.      atorvastatin (LIPITOR) 80 MG tablet       butalbital-acetaminophen-caffeine -40 mg (FIORICET, ESGIC) -40 mg per tablet Take 1 tablet by mouth every 6 (six) hours as needed.      difluprednate (DUREZOL) 0.05 % Drop ophthalmic solution PLACE 1 DROP INTO THE LEFT EYE 3 TIMES DAILY. START AFTER SURGERY      ELIQUIS 5 mg Tab Take by mouth Daily.      FLUoxetine 20 MG capsule TAKE ONE CAPSULE BY MOUTH EVERY DAY 90 capsule 2    gabapentin (NEURONTIN) 100 MG capsule Take 100 mg by mouth every evening.      LORazepam (ATIVAN) 0.5 MG tablet Take 0.5 mg by mouth every 4 (four) hours as needed for Anxiety.      meclizine (ANTIVERT) 25 mg tablet Take 25 mg by mouth 3 times daily as needed.      metoprolol succinate (TOPROL-XL) 50 MG 24 hr tablet TAKE 1 TABLET BY MOUTH EVERY DAY 30 tablet 3    mirtazapine (REMERON) 7.5 MG Tab Take 7.5 mg by mouth every evening.      nitroGLYCERIN (NITROSTAT) 0.4 MG SL tablet Place 0.4 mg under the tongue every 5 (five) minutes as needed for Chest pain.      pantoprazole (PROTONIX) 40 MG tablet Take 1 tablet (40 mg total) by mouth once daily. 90 tablet 3    traMADoL (ULTRAM) 50 mg tablet Take 50 mg by mouth every 6 (six) hours as needed for Pain.      [DISCONTINUED]  "albuterol (PROAIR HFA) 90 mcg/actuation inhaler Inhale 2 puffs into the lungs every 6 (six) hours as needed for Wheezing. 3 Inhaler 3    [DISCONTINUED] amLODIPine (NORVASC) 2.5 MG tablet TAKE 1 TABLET BY MOUTH EVERY DAY 30 tablet 3    [DISCONTINUED] fluticasone-salmeterol 100-50 mcg/dose (ADVAIR DISKUS) 100-50 mcg/dose diskus inhaler INHALE 1 PUFF INTO THE LUNGS TWICE A DAY 60 each 5    [DISCONTINUED] ketorolac 0.5% (ACULAR) 0.5 % Drop Place 1 drop into both eyes 3 (three) times daily.      [DISCONTINUED] RESTASIS 0.05 % ophthalmic emulsion        No current facility-administered medications on file prior to visit.     Social History     Socioeconomic History    Marital status:    Tobacco Use    Smoking status: Never   Substance and Sexual Activity    Alcohol use: No     Family History   Problem Relation Age of Onset    Hypertension Mother     Throat cancer Mother     Heart attack Mother 60        MI    Hypertension Sister     Hypertension Father     Thyroid disease Sister     Stroke Sister            ROS:  GENERAL: No fever, chills,  or significant weight changes.   CARDIOVASCULAR: Denies chest pain, PND, orthopnea or reduced exercise tolerance.  ABDOMEN: Appetite fine. Denies diarrhea, abdominal pain, hematemesis or blood in stool.  URINARY: No flank pain, dysuria or hematuria.    Vitals:    02/21/24 0952 02/21/24 1042   BP: (!) 182/91 (!) 172/86   Pulse: 66    Temp: 97.7 °F (36.5 °C)    TempSrc: Temporal    Weight: 60.9 kg (134 lb 3.2 oz)    Height: 5' 5" (1.651 m)      Wt Readings from Last 3 Encounters:   02/21/24 60.9 kg (134 lb 3.2 oz)   11/06/23 62 kg (136 lb 11.2 oz)   07/07/23 61.3 kg (135 lb 1.6 oz)       OBJECTIVE:   APPEARANCE: Well nourished, well developed, in no acute distress.    HEAD: Normocephalic.  Atraumatic.  No sinus tenderness.  EYES:   Right eye: Pupil reactive.  Conjunctiva clear.    Left eye: Pupil reactive.  Conjunctiva clear.  EOMI.    EARS: TM's intact. Light reflex normal. No " retraction or perforation.    NOSE:  clear.  MOUTH & THROAT:  No pharyngeal erythema or exudate. No lesions.  NECK: Supple. No bruits.  No JVD.  No cervical lymphadenopathy.  No thyromegaly.    CHEST: Breath sounds clear bilaterally.  Normal respiratory effort  CARDIOVASCULAR: Normal rate.  Regular rhythm.  No murmurs.  No rub.  No gallops.  ABDOMEN: Bowel sounds normal.  Soft.  No tenderness.  No organomegaly.  PERIPHERAL VASCULAR: No cyanosis.  No clubbing.  No edema.  MENTAL STATUS: Alert.  Oriented x 3.  NEUROLOGIC: Cranial nerves two through 12 are intact. Motor strength is normal in the upper and lower extremities.  No obvious tremor.  Her speech is halting

## 2024-02-29 RX ORDER — GABAPENTIN 100 MG/1
100 CAPSULE ORAL NIGHTLY
Qty: 30 CAPSULE | Refills: 3 | Status: SHIPPED | OUTPATIENT
Start: 2024-02-29

## 2024-03-21 ENCOUNTER — TELEPHONE (OUTPATIENT)
Dept: FAMILY MEDICINE | Facility: CLINIC | Age: 84
End: 2024-03-21
Payer: MEDICARE

## 2024-03-21 NOTE — TELEPHONE ENCOUNTER
----- Message from Marimar Samson sent at 3/21/2024  2:12 PM CDT -----  Contact: Yany/ Dixie  Cheryl with [facility] is calling to speak with the nurse regarding authorization  . Reports needing authroization for MRI  . Please give Yany  a call back at [phone]

## 2024-03-21 NOTE — TELEPHONE ENCOUNTER
Yany rodriguez preservice is working on auth, will fax to [phone] once done, MRI is scheduled Monday

## 2024-04-03 NOTE — TELEPHONE ENCOUNTER
No care due was identified.  [facility] Embedded Care Due Messages. Reference number: [id number].   4/03/2024 2:39:15 PM CDT

## 2024-04-05 ENCOUNTER — TELEPHONE (OUTPATIENT)
Dept: FAMILY MEDICINE | Facility: CLINIC | Age: 84
End: 2024-04-05
Payer: MEDICARE

## 2024-04-05 RX ORDER — METOPROLOL SUCCINATE 50 MG/1
50 TABLET, EXTENDED RELEASE ORAL
Qty: 90 TABLET | Refills: 0 | Status: SHIPPED | OUTPATIENT
Start: 2024-04-05

## 2024-04-05 NOTE — TELEPHONE ENCOUNTER
Refill Routing Note   Medication(s) are not appropriate for processing by [facility] for the following reason(s):        Required vitals abnormal  02/21/24 (!) 172/86   11/06/23 (!) 158/89       [facility] action(s):  Defer        Medication Therapy Plan: 02/21/24 (!) 172/86 11/06/23 (!) 158/89      Appointments  past 12m or future 3m with PCP    Date Provider   Last Visit   2/21/2024 Yohannes Toribio MD   Next Visit   Visit date not found Yohannes Toribio MD   ED visits in past 90 days: 0        Note composed:7:35 PM 04/04/2024

## 2024-04-05 NOTE — TELEPHONE ENCOUNTER
The MRI at [place] shows multiple old strokes as seen previously.  Nothing new or acute seen.  Recommend continue current medications and see the neurologist and speech therapist as we discussed at her appointment.

## 2024-04-19 RX ORDER — APIXABAN 5 MG/1
5 TABLET, FILM COATED ORAL
Qty: 30 TABLET | Refills: 3 | OUTPATIENT
Start: 2024-04-19

## 2024-04-19 NOTE — TELEPHONE ENCOUNTER
Medication was not prescribed here.  This is not standard dosing as requested.  She needs check with her cardiologist or neurologist on this as I believe they are prescribing it.

## 2024-04-19 NOTE — TELEPHONE ENCOUNTER
Lm on  w/ Dr Toribio response and recommendation . Asked to call the office w/ any questions or concerns.

## 2024-05-16 NOTE — TELEPHONE ENCOUNTER
No care due was identified.  [facility] Embedded Care Due Messages. Reference number: [id number].   5/16/2024 3:23:51 PM CDT

## 2024-05-17 RX ORDER — FLUOXETINE HYDROCHLORIDE 20 MG/1
20 CAPSULE ORAL
Qty: 90 CAPSULE | Refills: 3 | Status: SHIPPED | OUTPATIENT
Start: 2024-05-17

## 2024-05-17 NOTE — TELEPHONE ENCOUNTER
Refill Routing Note   Medication(s) are not appropriate for processing by [facility] for the following reason(s):        Due for refill >6 months ago    [facility] action(s):  Defer               Appointments  past 12m or future 3m with PCP    Date Provider   Last Visit   2/21/2024 Yohannes Toribio MD   Next Visit   Visit date not found Yohannes Toribio MD   ED visits in past 90 days: 0        Note composed:1:17 PM 05/17/2024

## 2024-06-17 DIAGNOSIS — E78.5 HYPERLIPIDEMIA, UNSPECIFIED HYPERLIPIDEMIA TYPE: Primary | ICD-10-CM

## 2024-06-17 DIAGNOSIS — I10 PRIMARY HYPERTENSION: ICD-10-CM

## 2024-06-17 NOTE — TELEPHONE ENCOUNTER
No care due was identified.  [facility] Embedded Care Due Messages. Reference number: [id number].   6/17/2024 2:13:21 PM CDT

## 2024-06-17 NOTE — TELEPHONE ENCOUNTER
Refill Routing Note   Medication(s) are not appropriate for processing by [facility] for the following reason(s):        No active prescription written by provider    [facility] action(s):  Defer             Appointments  past 12m or future 3m with PCP    Date Provider   Last Visit   2/21/2024 Yohannes Toribio MD   Next Visit   Visit date not found Yohannes Toribio MD   ED visits in past 90 days: 0        Note composed:6:27 PM 06/17/2024

## 2024-06-18 RX ORDER — ATORVASTATIN CALCIUM 80 MG/1
80 TABLET, FILM COATED ORAL
Qty: 90 TABLET | Refills: 0 | Status: SHIPPED | OUTPATIENT
Start: 2024-06-18

## 2024-08-08 RX ORDER — METOPROLOL SUCCINATE 50 MG/1
50 TABLET, EXTENDED RELEASE ORAL
Qty: 90 TABLET | Refills: 1 | Status: SHIPPED | OUTPATIENT
Start: 2024-08-08

## 2025-03-11 NOTE — TELEPHONE ENCOUNTER
PFT Interpretation:    NORMAL  Lung Mechanics, Volumes,and Diffusion Capacity is noted.  Evidence of air trapping / hyperinflation is NOT noted.  NO Significant improvement is noted lung mechanics after bronchodilators.      Maritza Acosta MD         Appointment scheduled for 11/6, per patient administration